# Patient Record
Sex: FEMALE | Race: WHITE | NOT HISPANIC OR LATINO | Employment: OTHER | URBAN - METROPOLITAN AREA
[De-identification: names, ages, dates, MRNs, and addresses within clinical notes are randomized per-mention and may not be internally consistent; named-entity substitution may affect disease eponyms.]

---

## 2017-05-25 ENCOUNTER — CONVERSION ENCOUNTER (OUTPATIENT)
Dept: GENERAL RADIOLOGY | Facility: HOSPITAL | Age: 72
End: 2017-05-25

## 2018-04-03 ENCOUNTER — OFFICE VISIT CONVERTED (OUTPATIENT)
Dept: ORTHOPEDIC SURGERY | Facility: CLINIC | Age: 73
End: 2018-04-03
Attending: ORTHOPAEDIC SURGERY

## 2018-04-18 ENCOUNTER — OFFICE VISIT CONVERTED (OUTPATIENT)
Dept: CARDIOLOGY | Facility: CLINIC | Age: 73
End: 2018-04-18
Attending: INTERNAL MEDICINE

## 2018-04-25 ENCOUNTER — OFFICE VISIT CONVERTED (OUTPATIENT)
Dept: ORTHOPEDIC SURGERY | Facility: CLINIC | Age: 73
End: 2018-04-25
Attending: ORTHOPAEDIC SURGERY

## 2018-06-06 ENCOUNTER — OFFICE VISIT CONVERTED (OUTPATIENT)
Dept: ORTHOPEDIC SURGERY | Facility: CLINIC | Age: 73
End: 2018-06-06
Attending: ORTHOPAEDIC SURGERY

## 2018-07-02 ENCOUNTER — OFFICE VISIT CONVERTED (OUTPATIENT)
Dept: OTOLARYNGOLOGY | Facility: CLINIC | Age: 73
End: 2018-07-02
Attending: OTOLARYNGOLOGY

## 2018-07-19 ENCOUNTER — OFFICE VISIT CONVERTED (OUTPATIENT)
Dept: OTOLARYNGOLOGY | Facility: CLINIC | Age: 73
End: 2018-07-19
Attending: OTOLARYNGOLOGY

## 2018-08-27 ENCOUNTER — OFFICE VISIT CONVERTED (OUTPATIENT)
Dept: OTOLARYNGOLOGY | Facility: CLINIC | Age: 73
End: 2018-08-27
Attending: OTOLARYNGOLOGY

## 2018-10-24 ENCOUNTER — OFFICE VISIT CONVERTED (OUTPATIENT)
Dept: CARDIOLOGY | Facility: CLINIC | Age: 73
End: 2018-10-24
Attending: INTERNAL MEDICINE

## 2018-10-25 ENCOUNTER — CONVERSION ENCOUNTER (OUTPATIENT)
Dept: GENERAL RADIOLOGY | Facility: HOSPITAL | Age: 73
End: 2018-10-25

## 2019-02-12 ENCOUNTER — HOSPITAL ENCOUNTER (OUTPATIENT)
Dept: PHYSICAL THERAPY | Facility: CLINIC | Age: 74
Setting detail: RECURRING SERIES
Discharge: HOME | End: 2019-03-13
Attending: FAMILY MEDICINE

## 2019-04-17 ENCOUNTER — HOSPITAL ENCOUNTER (OUTPATIENT)
Dept: OTHER | Facility: HOSPITAL | Age: 74
Discharge: HOME | End: 2019-04-17
Attending: INTERNAL MEDICINE

## 2019-04-17 LAB
ALBUMIN SERPL-MCNC: 4.2 G/DL (ref 3.5–5)
ALBUMIN/GLOB SERPL: 1.6 {RATIO} (ref 1.4–2.6)
ALP SERPL-CCNC: 75 U/L (ref 43–160)
ALT SERPL-CCNC: 24 U/L (ref 10–40)
ANION GAP SERPL CALC-SCNC: 16 MMOL/L (ref 8–19)
AST SERPL-CCNC: 20 U/L (ref 15–50)
BASOPHILS # BLD AUTO: 0.04 10*3/UL (ref 0–0.2)
BASOPHILS NFR BLD AUTO: 0.6 % (ref 0–3)
BILIRUB SERPL-MCNC: 0.58 MG/DL (ref 0.2–1.3)
BUN SERPL-MCNC: 17 MG/DL (ref 5–25)
BUN/CREAT SERPL: 21 {RATIO} (ref 6–20)
CALCIUM SERPL-MCNC: 9.9 MG/DL (ref 8.7–10.4)
CHLORIDE SERPL-SCNC: 105 MMOL/L (ref 99–111)
CHOLEST SERPL-MCNC: 152 MG/DL (ref 107–200)
CHOLEST/HDLC SERPL: 2.8 {RATIO} (ref 3–6)
CONV ABS IMM GRAN: 0.02 10*3/UL (ref 0–0.2)
CONV CO2: 25 MMOL/L (ref 22–32)
CONV IMMATURE GRAN: 0.3 % (ref 0–1.8)
CONV TOTAL PROTEIN: 6.8 G/DL (ref 6.3–8.2)
CREAT UR-MCNC: 0.81 MG/DL (ref 0.5–0.9)
DEPRECATED RDW RBC AUTO: 47.1 FL (ref 36.4–46.3)
EOSINOPHIL # BLD AUTO: 0.18 10*3/UL (ref 0–0.7)
EOSINOPHIL # BLD AUTO: 2.7 % (ref 0–7)
ERYTHROCYTE [DISTWIDTH] IN BLOOD BY AUTOMATED COUNT: 13.2 % (ref 11.7–14.4)
GFR SERPLBLD BASED ON 1.73 SQ M-ARVRAT: >60 ML/MIN/{1.73_M2}
GLOBULIN UR ELPH-MCNC: 2.6 G/DL (ref 2–3.5)
GLUCOSE SERPL-MCNC: 105 MG/DL (ref 65–99)
HBA1C MFR BLD: 13.3 G/DL (ref 12–16)
HCT VFR BLD AUTO: 40.6 % (ref 37–47)
HDLC SERPL-MCNC: 55 MG/DL (ref 40–60)
LDLC SERPL CALC-MCNC: 72 MG/DL (ref 70–100)
LYMPHOCYTES # BLD AUTO: 2.67 10*3/UL (ref 1–5)
MCH RBC QN AUTO: 31.5 PG (ref 27–31)
MCHC RBC AUTO-ENTMCNC: 32.8 G/DL (ref 33–37)
MCV RBC AUTO: 96.2 FL (ref 81–99)
MONOCYTES # BLD AUTO: 0.49 10*3/UL (ref 0.2–1.2)
MONOCYTES NFR BLD AUTO: 7.4 % (ref 3–10)
NEUTROPHILS # BLD AUTO: 3.24 10*3/UL (ref 2–8)
NEUTROPHILS NFR BLD AUTO: 48.8 % (ref 30–85)
NRBC CBCN: 0 % (ref 0–0.7)
OSMOLALITY SERPL CALC.SUM OF ELEC: 296 MOSM/KG (ref 273–304)
PLATELET # BLD AUTO: 271 10*3/UL (ref 130–400)
PMV BLD AUTO: 10 FL (ref 9.4–12.3)
POTASSIUM SERPL-SCNC: 4.4 MMOL/L (ref 3.5–5.3)
RBC # BLD AUTO: 4.22 10*6/UL (ref 4.2–5.4)
SODIUM SERPL-SCNC: 142 MMOL/L (ref 135–147)
TRIGL SERPL-MCNC: 127 MG/DL (ref 40–150)
VARIANT LYMPHS NFR BLD MANUAL: 40.2 % (ref 20–45)
VLDLC SERPL-MCNC: 25 MG/DL (ref 5–37)
WBC # BLD AUTO: 6.64 10*3/UL (ref 4.8–10.8)

## 2019-04-24 ENCOUNTER — CONVERSION ENCOUNTER (OUTPATIENT)
Dept: CARDIOLOGY | Facility: CLINIC | Age: 74
End: 2019-04-24

## 2019-04-24 ENCOUNTER — OFFICE VISIT CONVERTED (OUTPATIENT)
Dept: CARDIOLOGY | Facility: CLINIC | Age: 74
End: 2019-04-24
Attending: INTERNAL MEDICINE

## 2019-10-21 ENCOUNTER — HOSPITAL ENCOUNTER (OUTPATIENT)
Dept: OTHER | Facility: HOSPITAL | Age: 74
Discharge: HOME | End: 2019-10-21
Attending: INTERNAL MEDICINE

## 2019-10-21 LAB
ALBUMIN SERPL-MCNC: 4.4 G/DL (ref 3.5–5)
ALBUMIN/GLOB SERPL: 1.8 {RATIO} (ref 1.4–2.6)
ALP SERPL-CCNC: 74 U/L (ref 43–160)
ALT SERPL-CCNC: 28 U/L (ref 10–40)
ANION GAP SERPL CALC-SCNC: 15 MMOL/L (ref 8–19)
AST SERPL-CCNC: 24 U/L (ref 15–50)
BILIRUB SERPL-MCNC: 0.65 MG/DL (ref 0.2–1.3)
BUN SERPL-MCNC: 11 MG/DL (ref 5–25)
BUN/CREAT SERPL: 15 {RATIO} (ref 6–20)
CALCIUM SERPL-MCNC: 9.9 MG/DL (ref 8.7–10.4)
CHLORIDE SERPL-SCNC: 105 MMOL/L (ref 99–111)
CHOLEST SERPL-MCNC: 168 MG/DL (ref 107–200)
CHOLEST/HDLC SERPL: 3.7 {RATIO} (ref 3–6)
CONV CO2: 27 MMOL/L (ref 22–32)
CONV TOTAL PROTEIN: 6.9 G/DL (ref 6.3–8.2)
CREAT UR-MCNC: 0.74 MG/DL (ref 0.5–0.9)
GFR SERPLBLD BASED ON 1.73 SQ M-ARVRAT: >60 ML/MIN/{1.73_M2}
GLOBULIN UR ELPH-MCNC: 2.5 G/DL (ref 2–3.5)
GLUCOSE SERPL-MCNC: 103 MG/DL (ref 65–99)
HDLC SERPL-MCNC: 46 MG/DL (ref 40–60)
LDLC SERPL CALC-MCNC: 79 MG/DL (ref 70–100)
OSMOLALITY SERPL CALC.SUM OF ELEC: 296 MOSM/KG (ref 273–304)
POTASSIUM SERPL-SCNC: 4.2 MMOL/L (ref 3.5–5.3)
SODIUM SERPL-SCNC: 143 MMOL/L (ref 135–147)
T4 FREE SERPL-MCNC: 1.4 NG/DL (ref 0.9–1.8)
TRIGL SERPL-MCNC: 217 MG/DL (ref 40–150)
TSH SERPL-ACNC: 3.75 M[IU]/L (ref 0.27–4.2)
VLDLC SERPL-MCNC: 43 MG/DL (ref 5–37)

## 2019-10-28 ENCOUNTER — OFFICE VISIT CONVERTED (OUTPATIENT)
Dept: CARDIOLOGY | Facility: CLINIC | Age: 74
End: 2019-10-28
Attending: INTERNAL MEDICINE

## 2019-11-15 ENCOUNTER — HOSPITAL ENCOUNTER (OUTPATIENT)
Dept: GENERAL RADIOLOGY | Facility: HOSPITAL | Age: 74
Discharge: HOME | End: 2019-11-15
Attending: NURSE PRACTITIONER

## 2019-12-27 ENCOUNTER — HOSPITAL ENCOUNTER (OUTPATIENT)
Dept: OTHER | Facility: HOSPITAL | Age: 74
Discharge: HOME | End: 2019-12-27
Attending: INTERNAL MEDICINE

## 2019-12-27 LAB
CHOLEST SERPL-MCNC: 142 MG/DL (ref 107–200)
CHOLEST/HDLC SERPL: 2.8 {RATIO} (ref 3–6)
HDLC SERPL-MCNC: 50 MG/DL (ref 40–60)
LDLC SERPL CALC-MCNC: 66 MG/DL (ref 70–100)
TRIGL SERPL-MCNC: 128 MG/DL (ref 40–150)
VLDLC SERPL-MCNC: 26 MG/DL (ref 5–37)

## 2020-04-27 ENCOUNTER — HOSPITAL ENCOUNTER (OUTPATIENT)
Dept: LAB | Facility: HOSPITAL | Age: 75
Discharge: HOME | End: 2020-04-27
Attending: NURSE PRACTITIONER

## 2020-04-27 LAB
ALBUMIN SERPL-MCNC: 4.4 G/DL (ref 3.5–5)
ALBUMIN/GLOB SERPL: 1.8 {RATIO} (ref 1.4–2.6)
ALP SERPL-CCNC: 71 U/L (ref 43–160)
ALT SERPL-CCNC: 33 U/L (ref 10–40)
ANION GAP SERPL CALC-SCNC: 18 MMOL/L (ref 8–19)
AST SERPL-CCNC: 23 U/L (ref 15–50)
BILIRUB SERPL-MCNC: 0.48 MG/DL (ref 0.2–1.3)
BUN SERPL-MCNC: 13 MG/DL (ref 5–25)
BUN/CREAT SERPL: 19 {RATIO} (ref 6–20)
CALCIUM SERPL-MCNC: 10.6 MG/DL (ref 8.7–10.4)
CHLORIDE SERPL-SCNC: 105 MMOL/L (ref 99–111)
CHOLEST SERPL-MCNC: 162 MG/DL (ref 107–200)
CHOLEST/HDLC SERPL: 2.9 {RATIO} (ref 3–6)
CONV CO2: 24 MMOL/L (ref 22–32)
CONV TOTAL PROTEIN: 6.8 G/DL (ref 6.3–8.2)
CREAT UR-MCNC: 0.69 MG/DL (ref 0.5–0.9)
GFR SERPLBLD BASED ON 1.73 SQ M-ARVRAT: >60 ML/MIN/{1.73_M2}
GLOBULIN UR ELPH-MCNC: 2.4 G/DL (ref 2–3.5)
GLUCOSE SERPL-MCNC: 99 MG/DL (ref 65–99)
HDLC SERPL-MCNC: 56 MG/DL (ref 40–60)
LDLC SERPL CALC-MCNC: 77 MG/DL (ref 70–100)
OSMOLALITY SERPL CALC.SUM OF ELEC: 296 MOSM/KG (ref 273–304)
POTASSIUM SERPL-SCNC: 4.2 MMOL/L (ref 3.5–5.3)
SODIUM SERPL-SCNC: 143 MMOL/L (ref 135–147)
TRIGL SERPL-MCNC: 144 MG/DL (ref 40–150)
VLDLC SERPL-MCNC: 29 MG/DL (ref 5–37)

## 2020-05-04 ENCOUNTER — TELEPHONE CONVERTED (OUTPATIENT)
Dept: CARDIOLOGY | Facility: CLINIC | Age: 75
End: 2020-05-04
Attending: INTERNAL MEDICINE

## 2020-06-16 ENCOUNTER — HOSPITAL ENCOUNTER (OUTPATIENT)
Dept: LAB | Facility: HOSPITAL | Age: 75
Discharge: HOME | End: 2020-06-16
Attending: NURSE PRACTITIONER

## 2020-06-16 LAB
CHOLEST SERPL-MCNC: 150 MG/DL (ref 107–200)
CHOLEST/HDLC SERPL: 3.1 {RATIO} (ref 3–6)
HDLC SERPL-MCNC: 49 MG/DL (ref 40–60)
LDLC SERPL CALC-MCNC: 75 MG/DL (ref 70–100)
TRIGL SERPL-MCNC: 130 MG/DL (ref 40–150)
VLDLC SERPL-MCNC: 26 MG/DL (ref 5–37)

## 2020-09-01 ENCOUNTER — HOSPITAL ENCOUNTER (OUTPATIENT)
Dept: LAB | Facility: HOSPITAL | Age: 75
Discharge: HOME | End: 2020-09-01
Attending: INTERNAL MEDICINE

## 2020-09-01 LAB
ALBUMIN SERPL-MCNC: 4.2 G/DL (ref 3.5–5)
ALBUMIN/GLOB SERPL: 1.8 {RATIO} (ref 1.4–2.6)
ALP SERPL-CCNC: 80 U/L (ref 43–160)
ALT SERPL-CCNC: 43 U/L (ref 10–40)
ANION GAP SERPL CALC-SCNC: 15 MMOL/L (ref 8–19)
AST SERPL-CCNC: 33 U/L (ref 15–50)
BILIRUB SERPL-MCNC: 0.58 MG/DL (ref 0.2–1.3)
BUN SERPL-MCNC: 14 MG/DL (ref 5–25)
BUN/CREAT SERPL: 17 {RATIO} (ref 6–20)
CALCIUM SERPL-MCNC: 10.5 MG/DL (ref 8.7–10.4)
CHLORIDE SERPL-SCNC: 103 MMOL/L (ref 99–111)
CHOLEST SERPL-MCNC: 125 MG/DL (ref 107–200)
CHOLEST/HDLC SERPL: 2.7 {RATIO} (ref 3–6)
CONV CO2: 25 MMOL/L (ref 22–32)
CONV TOTAL PROTEIN: 6.5 G/DL (ref 6.3–8.2)
CREAT UR-MCNC: 0.81 MG/DL (ref 0.5–0.9)
GFR SERPLBLD BASED ON 1.73 SQ M-ARVRAT: >60 ML/MIN/{1.73_M2}
GLOBULIN UR ELPH-MCNC: 2.3 G/DL (ref 2–3.5)
GLUCOSE SERPL-MCNC: 104 MG/DL (ref 65–99)
HDLC SERPL-MCNC: 47 MG/DL (ref 40–60)
LDLC SERPL CALC-MCNC: 51 MG/DL (ref 70–100)
OSMOLALITY SERPL CALC.SUM OF ELEC: 289 MOSM/KG (ref 273–304)
POTASSIUM SERPL-SCNC: 4.4 MMOL/L (ref 3.5–5.3)
SODIUM SERPL-SCNC: 139 MMOL/L (ref 135–147)
T4 FREE SERPL-MCNC: 1.5 NG/DL (ref 0.9–1.8)
TRIGL SERPL-MCNC: 133 MG/DL (ref 40–150)
TSH SERPL-ACNC: 3.59 M[IU]/L (ref 0.27–4.2)
VLDLC SERPL-MCNC: 27 MG/DL (ref 5–37)

## 2020-09-10 ENCOUNTER — CONVERSION ENCOUNTER (OUTPATIENT)
Dept: CARDIOLOGY | Facility: CLINIC | Age: 75
End: 2020-09-10

## 2020-09-10 ENCOUNTER — OFFICE VISIT CONVERTED (OUTPATIENT)
Dept: CARDIOLOGY | Facility: CLINIC | Age: 75
End: 2020-09-10
Attending: INTERNAL MEDICINE

## 2020-10-23 ENCOUNTER — HOSPITAL ENCOUNTER (OUTPATIENT)
Dept: LAB | Facility: HOSPITAL | Age: 75
Discharge: HOME | End: 2020-10-23
Attending: INTERNAL MEDICINE

## 2020-10-23 ENCOUNTER — OFFICE VISIT CONVERTED (OUTPATIENT)
Dept: FAMILY MEDICINE CLINIC | Facility: CLINIC | Age: 75
End: 2020-10-23
Attending: INTERNAL MEDICINE

## 2020-10-23 ENCOUNTER — CONVERSION ENCOUNTER (OUTPATIENT)
Dept: FAMILY MEDICINE CLINIC | Facility: CLINIC | Age: 75
End: 2020-10-23

## 2020-10-23 LAB
BASOPHILS # BLD AUTO: 0.04 10*3/UL (ref 0–0.2)
BASOPHILS NFR BLD AUTO: 0.5 % (ref 0–3)
CONV ABS IMM GRAN: 0.03 10*3/UL (ref 0–0.2)
CONV IMMATURE GRAN: 0.4 % (ref 0–1.8)
DEPRECATED RDW RBC AUTO: 46.9 FL (ref 36.4–46.3)
EOSINOPHIL # BLD AUTO: 0.07 10*3/UL (ref 0–0.7)
EOSINOPHIL # BLD AUTO: 0.8 % (ref 0–7)
ERYTHROCYTE [DISTWIDTH] IN BLOOD BY AUTOMATED COUNT: 13.2 % (ref 11.7–14.4)
FOLATE SERPL-MCNC: >20 NG/ML (ref 4.8–20)
HCT VFR BLD AUTO: 44.5 % (ref 37–47)
HGB BLD-MCNC: 14.5 G/DL (ref 12–16)
IRON SATN MFR SERPL: 32 % (ref 20–55)
IRON SERPL-MCNC: 142 UG/DL (ref 60–170)
LYMPHOCYTES # BLD AUTO: 3.43 10*3/UL (ref 1–5)
LYMPHOCYTES NFR BLD AUTO: 41.5 % (ref 20–45)
MCH RBC QN AUTO: 31.3 PG (ref 27–31)
MCHC RBC AUTO-ENTMCNC: 32.6 G/DL (ref 33–37)
MCV RBC AUTO: 95.9 FL (ref 81–99)
MONOCYTES # BLD AUTO: 0.55 10*3/UL (ref 0.2–1.2)
MONOCYTES NFR BLD AUTO: 6.7 % (ref 3–10)
NEUTROPHILS # BLD AUTO: 4.15 10*3/UL (ref 2–8)
NEUTROPHILS NFR BLD AUTO: 50.1 % (ref 30–85)
NRBC CBCN: 0 % (ref 0–0.7)
PLATELET # BLD AUTO: 325 10*3/UL (ref 130–400)
PMV BLD AUTO: 10.5 FL (ref 9.4–12.3)
RBC # BLD AUTO: 4.64 10*6/UL (ref 4.2–5.4)
TIBC SERPL-MCNC: 450 UG/DL (ref 245–450)
TRANSFERRIN SERPL-MCNC: 315 MG/DL (ref 250–380)
VIT B12 SERPL-MCNC: 644 PG/ML (ref 211–911)
WBC # BLD AUTO: 8.27 10*3/UL (ref 4.8–10.8)

## 2020-12-10 ENCOUNTER — HOSPITAL ENCOUNTER (OUTPATIENT)
Dept: LAB | Facility: HOSPITAL | Age: 75
Discharge: HOME | End: 2020-12-10
Attending: INTERNAL MEDICINE

## 2020-12-10 LAB
ALBUMIN SERPL-MCNC: 4.2 G/DL (ref 3.5–5)
ALBUMIN/GLOB SERPL: 1.6 {RATIO} (ref 1.4–2.6)
ALP SERPL-CCNC: 77 U/L (ref 43–160)
ALT SERPL-CCNC: 31 U/L (ref 10–40)
ANION GAP SERPL CALC-SCNC: 13 MMOL/L (ref 8–19)
AST SERPL-CCNC: 28 U/L (ref 15–50)
BILIRUB SERPL-MCNC: 0.52 MG/DL (ref 0.2–1.3)
BUN SERPL-MCNC: 14 MG/DL (ref 5–25)
BUN/CREAT SERPL: 18 {RATIO} (ref 6–20)
CALCIUM SERPL-MCNC: 10 MG/DL (ref 8.7–10.4)
CHLORIDE SERPL-SCNC: 105 MMOL/L (ref 99–111)
CHOLEST SERPL-MCNC: 171 MG/DL (ref 107–200)
CHOLEST/HDLC SERPL: 3.7 {RATIO} (ref 3–6)
CONV CO2: 26 MMOL/L (ref 22–32)
CONV TOTAL PROTEIN: 6.8 G/DL (ref 6.3–8.2)
CREAT UR-MCNC: 0.76 MG/DL (ref 0.5–0.9)
GFR SERPLBLD BASED ON 1.73 SQ M-ARVRAT: >60 ML/MIN/{1.73_M2}
GLOBULIN UR ELPH-MCNC: 2.6 G/DL (ref 2–3.5)
GLUCOSE SERPL-MCNC: 100 MG/DL (ref 65–99)
HDLC SERPL-MCNC: 46 MG/DL (ref 40–60)
LDLC SERPL CALC-MCNC: 89 MG/DL (ref 70–100)
OSMOLALITY SERPL CALC.SUM OF ELEC: 291 MOSM/KG (ref 273–304)
POTASSIUM SERPL-SCNC: 4 MMOL/L (ref 3.5–5.3)
SODIUM SERPL-SCNC: 140 MMOL/L (ref 135–147)
TRIGL SERPL-MCNC: 178 MG/DL (ref 40–150)
VLDLC SERPL-MCNC: 36 MG/DL (ref 5–37)

## 2021-01-28 ENCOUNTER — OFFICE VISIT CONVERTED (OUTPATIENT)
Dept: FAMILY MEDICINE CLINIC | Facility: CLINIC | Age: 76
End: 2021-01-28
Attending: PHYSICIAN ASSISTANT

## 2021-02-12 ENCOUNTER — HOSPITAL ENCOUNTER (OUTPATIENT)
Dept: LAB | Facility: HOSPITAL | Age: 76
Discharge: HOME | End: 2021-02-12
Attending: INTERNAL MEDICINE

## 2021-02-12 LAB
ANION GAP SERPL CALC-SCNC: 13 MMOL/L (ref 8–19)
BUN SERPL-MCNC: 13 MG/DL (ref 5–25)
BUN/CREAT SERPL: 18 {RATIO} (ref 6–20)
CALCIUM SERPL-MCNC: 10.5 MG/DL (ref 8.7–10.4)
CHLORIDE SERPL-SCNC: 102 MMOL/L (ref 99–111)
CONV CO2: 29 MMOL/L (ref 22–32)
CREAT UR-MCNC: 0.73 MG/DL (ref 0.5–0.9)
GFR SERPLBLD BASED ON 1.73 SQ M-ARVRAT: >60 ML/MIN/{1.73_M2}
GLUCOSE SERPL-MCNC: 96 MG/DL (ref 65–99)
OSMOLALITY SERPL CALC.SUM OF ELEC: 290 MOSM/KG (ref 273–304)
POTASSIUM SERPL-SCNC: 3.8 MMOL/L (ref 3.5–5.3)
SODIUM SERPL-SCNC: 140 MMOL/L (ref 135–147)

## 2021-02-26 ENCOUNTER — HOSPITAL ENCOUNTER (OUTPATIENT)
Dept: GENERAL RADIOLOGY | Facility: HOSPITAL | Age: 76
Discharge: HOME | End: 2021-02-26
Attending: INTERNAL MEDICINE

## 2021-03-15 ENCOUNTER — HOSPITAL ENCOUNTER (OUTPATIENT)
Dept: LAB | Facility: HOSPITAL | Age: 76
Discharge: HOME | End: 2021-03-15
Attending: NURSE PRACTITIONER

## 2021-03-15 LAB
ALBUMIN SERPL-MCNC: 4.4 G/DL (ref 3.5–5)
ALBUMIN/GLOB SERPL: 1.6 {RATIO} (ref 1.4–2.6)
ALP SERPL-CCNC: 78 U/L (ref 43–160)
ALT SERPL-CCNC: 31 U/L (ref 10–40)
ANION GAP SERPL CALC-SCNC: 19 MMOL/L (ref 8–19)
AST SERPL-CCNC: 29 U/L (ref 15–50)
BILIRUB SERPL-MCNC: 0.22 MG/DL (ref 0.2–1.3)
BUN SERPL-MCNC: 18 MG/DL (ref 5–25)
BUN/CREAT SERPL: 23 {RATIO} (ref 6–20)
CALCIUM SERPL-MCNC: 10.9 MG/DL (ref 8.7–10.4)
CHLORIDE SERPL-SCNC: 102 MMOL/L (ref 99–111)
CHOLEST SERPL-MCNC: 194 MG/DL (ref 107–200)
CHOLEST/HDLC SERPL: 3.9 {RATIO} (ref 3–6)
CONV CO2: 23 MMOL/L (ref 22–32)
CONV TOTAL PROTEIN: 7.1 G/DL (ref 6.3–8.2)
CREAT UR-MCNC: 0.78 MG/DL (ref 0.5–0.9)
GFR SERPLBLD BASED ON 1.73 SQ M-ARVRAT: >60 ML/MIN/{1.73_M2}
GLOBULIN UR ELPH-MCNC: 2.7 G/DL (ref 2–3.5)
GLUCOSE SERPL-MCNC: 96 MG/DL (ref 65–99)
HDLC SERPL-MCNC: 50 MG/DL (ref 40–60)
LDLC SERPL CALC-MCNC: 109 MG/DL (ref 70–100)
OSMOLALITY SERPL CALC.SUM OF ELEC: 292 MOSM/KG (ref 273–304)
POTASSIUM SERPL-SCNC: 3.8 MMOL/L (ref 3.5–5.3)
SODIUM SERPL-SCNC: 140 MMOL/L (ref 135–147)
TRIGL SERPL-MCNC: 176 MG/DL (ref 40–150)
VLDLC SERPL-MCNC: 35 MG/DL (ref 5–37)

## 2021-04-05 ENCOUNTER — OFFICE VISIT CONVERTED (OUTPATIENT)
Dept: CARDIOLOGY | Facility: CLINIC | Age: 76
End: 2021-04-05
Attending: INTERNAL MEDICINE

## 2021-04-29 ENCOUNTER — OFFICE VISIT CONVERTED (OUTPATIENT)
Dept: FAMILY MEDICINE CLINIC | Facility: CLINIC | Age: 76
End: 2021-04-29
Attending: PHYSICIAN ASSISTANT
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
## 2021-05-13 NOTE — PROGRESS NOTES
Progress Note      Patient Name: Shreya Marino   Patient ID: [id number]   Sex: Female   YOB: 1945    Primary Care Provider: Frandy Gibbs MD   Referring Provider: Frandy Gibbs MD    Visit Date: September 10, 2020    Provider: Frandy Renner MD   Location: [facility]   Location Address: [address] [place] [address], [address]   [place]  [id number]   Location Phone: [phone]          Chief Complaint  · Coronary artery disease, hypertension, hyperlipidemia, frequent bowel movements with Zetia      History Of Present Illness  REFERRING PROVIDER: Frandy Gibbs MD   Shreya Marino is a 75-year-old female with coronary artery disease and hyperlipidemia who is doing well from a cardiac standpoint. She denies chest pain, shortness of breath, PND, orthopnea, palpitations, dizziness, or syncope. Patient complains of having frequent bowel movements with the Zetia. She also has been complaining of myalgias with her Atorvastatin, mostly at night, and would like to change it.   PAST MEDICAL HISTORY: Coronary artery disease with previous stent/PCI (June 2016); hyperlipidemia .   FAMILY HISTORY: Positive for hypertension and heart disease. Negative for diabetes   PSYCHOSOCIAL HISTORY: No history of mood change or depression. She does not drink alcohol. She does not use tobacco.   CURRENT MEDICATIONS: include Ezetimibe 10 mg daily; Atorvastatin 40 mg at bedtime three days a week and 1/2 tablet the rest of the week; aspirin 81 mg daily; Zyrtec 10 mg daily; Caltrate 600 mg b.i.d.; vitamin D; Biotin; etc. The dosage and frequency of the medications were reviewed with the patient.       Review of Systems  · Cardiovascular  o Denies  o : palpitations (fast, fluttering, or skipping beats), swelling (feet, ankles, hands), shortness of breath while walking or lying flat, chest pain or angina pectoris   · Respiratory  o Denies  o : chronic or frequent cough, asthma or wheezing      Vitals  Date Time BP Position Site  "L\R Cuff Size HR RR TEMP (F) WT  HT  BMI kg/m2 BSA m2 O2 Sat        09/10/2020 10:12 /72 Sitting    84 - R   179lbs 0oz 5'  5\" 29.79 1.93            Blood pressure log looks good.       Physical Examination  · Constitutional  o Appearance  o : Awake, alert, in no acute distress.  · Eyes  o Conjunctivae  o : Conjunctivae normal.  · Ears, Nose, Mouth and Throat  o Oral Cavity  o :   § Oral Mucosa  § : Normal.  · Neck  o Inspection/Palpation/Auscultation  o : No lymphadenopathy. No JVD. No bruit. Good carotid upstroke.  · Respiratory  o Respiratory  o : Clear to percussion and auscultation. Good respiratory effort.  · Cardiovascular  o Heart  o : PMI is not well felt. S1, S2 are normal.   o Peripheral Vascular System  o :   § Extremities  § : Good femoral and pedal pulses. No pedal edema.  · Gastrointestinal  o Abdominal Examination  o : Soft. No masses or tenderness felt. No hepatosplenomegaly. Abdominal aorta is not palpable.  · EKG  o Indications  o : Coronary artery disease.  o Results  o : Sinus rhythm, borderline left axis deviation. Low voltage precordial leads.   o Comparison  o : No change compared to previous EKG.   · Labs  o Labs  o : HDL 47, LDL 51, triglyceride 133, ALT is minimally elevated at 43.           Assessment     1.  Coronary artery disease without angina pectoris.   2.  Hyperlipidemia, at goal.   3.  Increased bowel movements with Zetia and myalgias with Atorvastatin.       Plan     1.  Discontinue the Atorvastatin and start Livalo 1 mg a day.   2.  Break up the Zetia to 1/2 tablet b.i.d. and if that does not help her bowel movements, then we will go        to 1/2 tablet once a day.   3.  Obtain lipids and CMP in three months and try to achieve an LDL of less than 70.   4.  Follow up in six months.      Zulema Byrd MD, [facility]  PM/pap    This note was transcribed by Dottie Sampson.  pap/pm  The above service was transcribed by Dottie Sampson, and I attest to the accuracy of the note.  PM "             Electronically Signed by: Shanthi Sampson-, Other -Author on September 15, 2020 08:10:49 AM  Electronically Co-signed by: Zulema Byrd MD -Reviewer on September 15, 2020 04:47:33 PM

## 2021-05-13 NOTE — PROGRESS NOTES
Quick Note      Patient Name: Shreya Marino   Patient ID: [id number]   Sex: Female   YOB: 1945    Primary Care Provider: Frandy Gibbs MD   Referring Provider: Frandy Gibbs MD    Visit Date: May 4, 2020    Provider: Zulema Byrd MD   Location: [facility]   Location Address: [address] [place] [address], [facility]   [place]  [id number]   Location Phone: [phone]          History Of Present Illness  TELEHEALTH TELEPHONE VISIT  Chief Complaint: Diarrhea with Toprol, Hyperlipidemia, Coronary artery disease   Shreya Marino is a 74 year old /White female with coronary artery disease and had who is doing very well. Since her Toprol was stopped, her diarrhea has resolved. She denies any chest pain, palpitations, dizziness, or syncope. She has not had any significant chest pain over the last 6 months. She is presenting for evaluation via telehealth telephone visit. Verbal consent obtained before beginning visit. Telehealth visit due to COVID-19.   Provider spent 6 minutes with the patient during telehealth visit.   The following staff were present during this visit: Provider only.   Past Medical History/Overview of Patient Symptoms     PAST MEDICAL HISTORY:  Coronary artery disease with previous stent/PCI (June 2016); Hyperlipidemia.      FAMILY HISTORY:   Not indicated.      PSYCHOSOCIAL HISTORY:  Denies alcohol or tobacco use.  Gardening and works in yard for exercise.      CURRENT MEDICATIONS:  Aspirin 81 mg daily; atorvastatin 40 mg one on Monday, Wednesday, Friday and half the rest of the week; nitroglycerin p.r.n.; multivitamin daily; vitamin D3 daily; biotin daily; Caltrate with vitamin D b.i.d.; Zyrtec one daily p.r.n.  Dosage and frequency of the medications were reviewed with the patient.      REVIEW OF SYSTEMS:   Cardiovascular:  Denies palpitations (fast, fluttering, or skipping beats), swelling (feet, ankles, hands), shortness of breath while walking or lying  flat, chest pain or angina pectoris   Respiratory: Admits chronic or frequent cough; Denies asthma or wheezing       Vitals     Per patient, at-home vitals:   Blood pressure 118/66.  Heart rate 84.  Weight 175.       Physical Examination  · Labs  o Labs  o : Chemistry panel is normal. HDL 56, LDL 77, triglycerides 144.          Assessment     1.  Diarrhea with Toprol, resolved after discontinuing Toprol.  2.  Blood pressure well controlled off Toprol.  3.  Hyperlipidemia, not at goal.   4.  Coronary artery disease with previous stent/PCI without angina pectoris.       Plan     1.  Continue to stay off the metoprolol.  2.  We will consider starting Coreg if she starts to have anginal symptomatology.   3.  Increase the atorvastatin to 40 mg one on four days a week and half three days a week.  4.  Obtain a lipid panel in 6 weeks and office visit with blood work in four months.      Zulema Byrd MD, [facility]  PM/vm             Electronically Signed by: Cass Manjarrez-, Other -Author on May 7, 2020 01:11:43 PM  Electronically Co-signed by: Zulema Byrd MD -Reviewer on May 12, 2020 12:29:42 PM

## 2021-05-14 VITALS
WEIGHT: 180.37 LBS | TEMPERATURE: 97.9 F | DIASTOLIC BLOOD PRESSURE: 82 MMHG | SYSTOLIC BLOOD PRESSURE: 119 MMHG | HEIGHT: 65 IN | BODY MASS INDEX: 30.05 KG/M2 | HEART RATE: 95 BPM | OXYGEN SATURATION: 96 %

## 2021-05-14 VITALS
HEIGHT: 65 IN | BODY MASS INDEX: 29.82 KG/M2 | SYSTOLIC BLOOD PRESSURE: 130 MMHG | HEART RATE: 84 BPM | DIASTOLIC BLOOD PRESSURE: 72 MMHG | WEIGHT: 179 LBS

## 2021-05-14 VITALS
HEART RATE: 81 BPM | OXYGEN SATURATION: 96 % | BODY MASS INDEX: 29.42 KG/M2 | WEIGHT: 176.56 LBS | HEIGHT: 65 IN | DIASTOLIC BLOOD PRESSURE: 86 MMHG | SYSTOLIC BLOOD PRESSURE: 135 MMHG

## 2021-05-14 VITALS
WEIGHT: 180 LBS | SYSTOLIC BLOOD PRESSURE: 114 MMHG | HEART RATE: 76 BPM | BODY MASS INDEX: 29.99 KG/M2 | HEIGHT: 65 IN | DIASTOLIC BLOOD PRESSURE: 74 MMHG

## 2021-05-14 VITALS
BODY MASS INDEX: 29.91 KG/M2 | OXYGEN SATURATION: 95 % | WEIGHT: 179.5 LBS | HEIGHT: 65 IN | SYSTOLIC BLOOD PRESSURE: 122 MMHG | DIASTOLIC BLOOD PRESSURE: 81 MMHG | HEART RATE: 84 BPM

## 2021-05-14 NOTE — PROGRESS NOTES
Progress Note      Patient Name: Shreya Marino   Patient ID: [id number]   Sex: Female   YOB: 1945    Primary Care Provider: Alvino Mosley PA-C   Referring Provider: Alvino Mosley PA-C    Visit Date: January 28, 2021    Provider: Alvino Mosley PA-C   Location: [facility]   Location Address: [address] [place] [address], [address]  [place], [place]  [id number]   Location Phone: [phone]          Chief Complaint  · Annual Wellness Exam      History Of Present Illness  The patient is a 75 year old /White female who has come to this office for her Annual Wellness Visit.   Her Primary Care Provider is Alvino Mosley PA-C. Her comprehensive Care Team list, including suppliers, has been updated on the Facesheet. Her medical/family history, height, weight, BMI, and blood pressure have been reviewed and are in the chart. The Health Risk Assessment has been completed and scanned in the chart.   Medications are listed in the medication list.   The active problem list includes: Biceps tendinitis of right upper extremity, CAD (coronary artery disease), Hyperlipidemia, Nasal fracture, Right Rotator cuff tear, RIght shoulder pain, SLAP tear of shoulder, Subacromial impingement, Subacromial impingement of right shoulder, and Vitamin D deficiency   The patient does not have a history of substance use.   Patient reports her diet is adequate.   The Mini-Cog has been administered and is scanned in chart. The results are negative. Her cognitive function is without limitation.   A hearing loss screen was completed today and the result is negative.   Patient does not have any risk factors for depression. Patient completed the PHQ-9 today and it has been scanned in the chart. The total score is 1-4.   The Timed Up and Go screen was administered today and the result is negative.   The Tolbert Index of Bradley in ADLs indicated full function (score of 6).   A Falls Risk Assessment has been completed, including  a review of home fall hazards and medication review.   Overall, the patient's functional ability is noted by this provider to be within normal limits. Her level of safety is noted to be within normal limits. Her balance/gait is within normal limits. There have been no falls in the past year. Patient-specific home safety recommendations have been reviewed and a copy has been given to patient.   She admits issues with leaking urine. This happens frequently and she would like to discuss this further today.   There are no additional risk factors identified.   Living Will/Advanced Directive has not previously been completed.   Personalized health advice was given to the patient and a written health screening schedule was established; see Plan for details.   Shreya Marino is a 75 year old /White female who presents for evaluation and treatment of:       Past Medical History  Disease Name Date Onset Notes   Biceps tendinitis of right upper extremity 04/25/2018 --    CAD (coronary artery disease) 07/06/2016 --    Chest Pain --  --    Heart Attack --  --    Heartburn --  --    Hyperlipidemia 01/28/2021 --    Nasal fracture --  --    Right Rotator cuff tear 04/25/2018 --    RIght shoulder pain --  --    SLAP tear of shoulder 04/25/2018 --    Subacromial impingement 04/25/2018 --    Subacromial impingement of right shoulder 04/18/2018 --    Vitamin D deficiency 01/28/2021 --          Past Surgical History  Procedure Name Date Notes   Breast biopsy, left breast --  --    Cataract surgery --  --    Closed reduction, fracture, nasal septum 07/10/18 --    Hysterectomy --  --    Stent placment --  --          Medication List  Name Date Started Instructions   aspirin 81 mg oral tablet,delayed release (DR/EC)  take 1 tablet (81 mg) by oral route once daily   biotin 5,000 mcg oral tablet,disintegrating  dissolve 1 tablet by oral route daily   Caltrate 600 + D 600 mg (1,500 mg)-800 unit oral tablet,chewable  chew 1 tablet by  "oral route 2 times a day   ezetimibe 10 mg oral tablet 01/04/2021 TAKE ONE TABLET BY MOUTH DAILY   Livalo 1 mg oral tablet 12/10/2020 Take 1 tablet by mouth once daily   Nitrostat 0.4 mg sublingual tablet, sublingual 09/08/2017 DISSOLVE 1 TABLET UNDER TONGUE FOR CHEST PAIN - IF PAIN REMAINS AFTER 5 MIN, CALL 911 AND REPEAT DOSE. MAX 3 TABLETS IN 15 MINUTES.   One-A-Day Womens Formula 18 mg iron-400 mcg-500 mg Ca oral tablet  take 1 tablet by oral route daily   oxybutynin chloride 5 mg oral tablet extended release 24hr 01/28/2021 take 1 tablet (5 mg) by oral route once daily   Vitamin D3 1,000 unit oral capsule  take 1 capsule by oral route daily         Allergy List  Allergen Name Date Reaction Notes   NO KNOWN DRUG ALLERGIES --  --  --          Family Medical History  Disease Name Relative/Age Notes   Breast Neoplasm, Malignant  --    Heart Disease  --    Cancer, Unspecified  --          Social History  Finding Status Start/Stop Quantity Notes   lives with spouse --  --/-- --  --    Retired --  --/-- --  --    Tobacco Never --/-- --  --          Immunizations  NameDate Admin Mfg Trade Name Lot Number Route Inj VIS Given VIS Publication   [id number]/01/2020 SKB Fluzone Quadrivalent  NE NE 10/01/2020    Comments: drew         Vitals  Date Time BP Position Site L\R Cuff Size HR RR TEMP (F) WT  HT  BMI kg/m2 BSA m2 O2 Sat FR L/min FiO2 HC       01/28/2021 11:01 /81 Sitting    84 - R   179lbs 8oz 5'  5\" 29.87 1.93 95 %            Physical Examination  · Head and Face  o Head  o : normocephalic, atraumatic  · Ears, Nose, Mouth and Throat  o Ears  o :   § External Ears  § : external auditory canal appearance normal, no discharge present  § Otoscopic Examination  § : tympanic membranes pearly white/gray bilaterally  o Nose  o :   § External Nose  § : no lesions noted  § Nasopharynx  § : no discharge present  o Oral Cavity  o :   § Oral Mucosa  § : oral mucosa light pink  o Throat  o :   § Oropharynx  § : tonsils " without exudate, no palatal petechiae  · Neck  o Inspection/Palpation  o : normal appearance, no masses or tenderness, trachea midline  o Thyroid  o : gland size normal, nontender, no nodules or masses present on palpation  · Respiratory  o Respiratory Effort  o : breathing unlabored  o Inspection of Chest  o : chest rise symmetric bilaterally  o Auscultation of Lungs  o : clear to auscultation bilaterally throughout inspiration and expiration  · Cardiovascular  o Heart  o :   § Auscultation of Heart  § : regular rate and rhythm, no murmurs, gallops or rubs  o Peripheral Vascular System  o :   § Extremities  § : no edema  · Lymphatic  o Neck  o : no cervical lymphadenopathy, no supraclavicular lymphadenopathy  · Psychiatric  o Mood and Affect  o : mood normal, affect appropriate          Assessment  · Encounter for Medicare annual wellness exam     V70.0/Z00.00  · Screening for depression     V79.0/Z13.89  · Screening for alcoholism     V79.1/Z13.39  · Advanced care planning/counseling discussion     V65.49/Z71.89      Plan  · Orders  o Falls Risk Assessment Completed (3288F) - V70.0/Z00.00 - 01/28/2021  o Brief hearing screening (written) [facility] () - V70.0/Z00.00 - 01/28/2021  o Annual Wellness Visit-includes a Personalized Prevention Plan of Service (PPS), SUBSEQUENT VISIT (Medicare) () - V70.0/Z00.00 - 01/28/2021  o Presence or absence of urinary incontinence assessed (GLORY) (1090F) - V70.0/Z00.00 - 01/28/2021  o Negative alcohol screening () - V79.1/Z13.39 - 01/28/2021  o ACO-20: Screening Mammography documented and reviewed () - - 01/28/2021  o ACO-14: Influenza immunization administered or previously received [facility] () - - 01/28/2021  o ACO-39: Current medications updated and reviewed (, 1159F) - - 01/28/2021  o ACO-18: Negative screen for clinical depression using a standardized tool () - - 01/28/2021  o ACO-13: Fall Risk Screening with no falls in past year or only one fall  without injury in the past year (1101F) - - 01/28/2021  o ACO - Pt declines to or was not able to provide an Advance Care Plan or name a Surrogate Decision Maker (1124F) - - 01/28/2021  · Medications  o Medications have been Reconciled  o Transition of Care or Provider Policy  · Instructions  o Health Risk Assessment has been reviewed with the patient.  o Written health screening schedule for next 5-10 years was established with patient; information scanned in chart and given/mailed to patient.  o Fall prevention methods discussed and a copy of recommendations given/mailed to patient.  o Today's PHQ-9 score is: 2___  o Depression Screen completed and scanned into the EMR under the designated folder within the patient's documents.  o Patient was educated/instructed on their diagnosis, treatment and medications prior to discharge from the clinic today.            Electronically Signed by: Alvino Mosley PA-C -Author on January 28, 2021 12:26:09 PM

## 2021-05-14 NOTE — PROGRESS NOTES
Progress Note      Patient Name: Shreya Marino   Patient ID: [id number]   Sex: Female   YOB: 1945    Primary Care Provider: Alvino Mosley PA-C   Referring Provider: Alvino Mosley PA-C    Visit Date: January 28, 2021    Provider: Alvino Mosley PA-C   Location: [facility]   Location Address: [address] [place] [address], [address]  [place], [place]  [id number]   Location Phone: [phone]          Chief Complaint  · establish care  · bilateral ankle swelling  · urine incontinence      History Of Present Illness  Shreya Marino is a 75 year old /White female who presents for evaluation and treatment of: establish care.      pt presents today to laith benito/former  pt.    pt states since Christmas she has been experiencing swelling in both of her ankles. pt states by the end of the day they are swollen.  Pt states that the swelling occurs throughout the day.  Better in the mornings.  No orthopnea, cough, soa, CP    pt states she has been having urine incontinence off and on for a while. pt states at times she does not make it to the bathroom and she has been getting up several times during the night to go as well.    CAD-Cardio- last appt was 11/20 next appt is in the Spring    Labs 12/20  flu 10/20  AWV due today    Byrd - Cardio - Zetia 10mg QD hyperlipidemia - controlled    Two stents - CAD    Urinary urgency - began last summer   Bilat LE - edema - since Christmas         Past Medical History  Disease Name Date Onset Notes   Biceps tendinitis of right upper extremity 04/25/2018 --    CAD (coronary artery disease) 07/06/2016 --    Chest Pain --  --    Heart Attack --  --    Heartburn --  --    Nasal fracture --  --    Right Rotator cuff tear 04/25/2018 --    RIght shoulder pain --  --    SLAP tear of shoulder 04/25/2018 --    Subacromial impingement 04/25/2018 --    Subacromial impingement of right shoulder 04/18/2018 --          Past Surgical History  Procedure  Name Date Notes   Breast biopsy, left breast --  --    Cataract surgery --  --    Closed reduction, fracture, nasal septum 07/10/18 --    Hysterectomy --  --    Stent placment --  --          Medication List  Name Date Started Instructions   aspirin 81 mg oral tablet,delayed release (DR/EC)  take 1 tablet (81 mg) by oral route once daily   biotin 5,000 mcg oral tablet,disintegrating  dissolve 1 tablet by oral route daily   Caltrate 600 + D 600 mg (1,500 mg)-800 unit oral tablet,chewable  chew 1 tablet by oral route 2 times a day   ezetimibe 10 mg oral tablet 01/04/2021 TAKE ONE TABLET BY MOUTH DAILY   Livalo 1 mg oral tablet 12/10/2020 Take 1 tablet by mouth once daily   Nitrostat 0.4 mg sublingual tablet, sublingual 09/08/2017 DISSOLVE 1 TABLET UNDER TONGUE FOR CHEST PAIN - IF PAIN REMAINS AFTER 5 MIN, CALL 911 AND REPEAT DOSE. MAX 3 TABLETS IN 15 MINUTES.   One-A-Day Womens Formula 18 mg iron-400 mcg-500 mg Ca oral tablet  take 1 tablet by oral route daily   Vitamin D3 1,000 unit oral capsule  take 1 capsule by oral route daily         Allergy List  Allergen Name Date Reaction Notes   NO KNOWN DRUG ALLERGIES --  --  --        Allergies Reconciled  Family Medical History  Disease Name Relative/Age Notes   Breast Neoplasm, Malignant  --    Heart Disease  --    Cancer, Unspecified  --          Social History  Finding Status Start/Stop Quantity Notes   lives with spouse --  --/-- --  --    Retired --  --/-- --  --    Tobacco Never --/-- --  --          Immunizations  NameDate Admin Mfg Trade Name Lot Number Route Inj VIS Given VIS Publication   [id number]/01/2020 SKB Fluzone Quadrivalent  NE NE 10/01/2020    Comments: drew         Review of Systems  · Constitutional  o Denies  o : fever, fatigue, weight loss, weight gain  · Cardiovascular  o Admits  o : lower extremity edema  o Denies  o : claudication, chest pressure, palpitations  · Respiratory  o Denies  o : shortness of breath, wheezing, cough, hemoptysis,  "dyspnea on exertion  · Gastrointestinal  o Denies  o : nausea, vomiting, diarrhea, constipation, abdominal pain      Vitals  Date Time BP Position Site L\R Cuff Size HR RR TEMP (F) WT  HT  BMI kg/m2 BSA m2 O2 Sat FR L/min FiO2 HC       01/28/2021 11:01 /81 Sitting    84 - R   179lbs 8oz 5'  5\" 29.87 1.93 95 %            Physical Examination  · Constitutional  o Appearance  o : overweight, well developed  · Head and Face  o Head  o : normocephalic, atraumatic  · Ears, Nose, Mouth and Throat  o Ears  o :   § External Ears  § : external auditory canal appearance normal, no discharge present  § Otoscopic Examination  § : tympanic membranes pearly white/gray bilaterally  o Nose  o :   § External Nose  § : no lesions noted  § Nasopharynx  § : no discharge present  o Oral Cavity  o :   § Oral Mucosa  § : oral mucosa light pink  o Throat  o :   § Oropharynx  § : tonsils without exudate, no palatal petechiae  · Neck  o Inspection/Palpation  o : normal appearance, no masses or tenderness, trachea midline  o Thyroid  o : gland size normal, nontender, no nodules or masses present on palpation  · Respiratory  o Respiratory Effort  o : breathing unlabored  o Inspection of Chest  o : chest rise symmetric bilaterally  o Auscultation of Lungs  o : clear to auscultation bilaterally throughout inspiration and expiration  · Cardiovascular  o Heart  o :   § Auscultation of Heart  § : regular rate and rhythm, no murmurs, gallops or rubs  o Peripheral Vascular System  o :   § Extremities  § : mild lower extremity edema present, no cyanosis, no distal hair loss, normal capillary refill  · Lymphatic  o Neck  o : no cervical lymphadenopathy, no supraclavicular lymphadenopathy  · Psychiatric  o Mood and Affect  o : mood normal, affect appropriate          Assessment  · Hyperlipidemia     272.4/E78.5  · Vitamin D deficiency     268.9/E55.9  · Screening for depression     V79.0/Z13.89  · Need for influenza " vaccination     V04.81/Z23  · Urine incontinence     788.30/R32  · CAD (coronary artery disease)     414.00/I25.10  · Urinary urgency     788.63/R39.15  · Edema     782.3/R60.9      Plan  · Orders  o ACO-18: Negative screen for clinical depression using a standardized tool () - V79.0/Z13.89 - 01/28/2021  o ACO-14: Influenza immunization was not administered for reasons documented () - V04.81/Z23 - 01/28/2021   rcvd 10/20  o ACO-13: Fall Risk Screening with no falls in past year or only one fall without injury in the past year (1101F) - - 01/28/2021  o ACO-39: Current medications updated and reviewed (1159F, ) - - 01/28/2021  · Medications  o oxybutynin chloride 5 mg oral tablet extended release 24hr   SIG: take 1 tablet (5 mg) by oral route once daily   DISP: (30) Tablet with 0 refills  Prescribed on 01/28/2021     o Medications have been Reconciled  o Transition of Care or Provider Policy  · Instructions  o Patient was educated and given low cholesterol diet information.  o Recommended exercise program to assist with cholesterol, weight loss and overall health improvement.  o Advised that cheeses and other sources of dairy fats, animal fats, fast food, and the extras (candy, pastries, pies, doughnuts and cookies) all contain LDL raising nutrients. Advised to increase fruits, vegetables, whole grains, and to monitor portion sizes.   o Depression Screen completed and scanned into the EMR under the designated folder within the patient's documents.  o Today's PHQ-9 result is 2___  o Flu vaccine declined.  o Take all medications as prescribed/directed.  o Patient instructed/educated on their diet and exercise program.  o Patient was educated/instructed on their diagnosis, treatment and medications prior to discharge from the clinic today.  o Patient counseled to reduce calorie intake.  o Patient was instructed to exercise regularly.  o Support Hose #1 pair  · Disposition  o Call or Return if symptoms  worsen or persist.  o F/U in 3 months.  o Care Transition            Electronically Signed by: Avlino Mosley PA-C -Author on January 31, 2021 11:19:14 AM

## 2021-05-14 NOTE — PROGRESS NOTES
"   Progress Note      Patient Name: Shreya Marino   Patient ID: [id number]   Sex: Female   YOB: 1945    Primary Care Provider: Alvino Mosley PA-C   Referring Provider: Alvino Mosley PA-C    Visit Date: April 5, 2021    Provider: Zulema Byrd MD   Location: [facility]   Location Address: [address] [place] [address], [address]   [place]  [id number]   Location Phone: [phone]          Chief Complaint     Hyperlipidemia.  Coronary artery disease.       History Of Present Illness  REFERRING PROVIDER: Alvino Mosley PA-C   Shreya Marino is a 75 year old /White female with coronary artery disease, hyperlipidemia, and hypertension who continues to have problems with poor appetite, nausea, and diarrhea. She thinks it is one of her medications. She denies chest pain, dizziness, or syncope.   PAST MEDICAL HISTORY: Coronary artery disease with previous stent/PCI (June 2016); Hyperlipidemia; Hypertension.   PSYCHOSOCIAL HISTORY: Denies alcohol or tobacco use.   CURRENT MEDICATIONS: Livalo 1 mg daily; ezetimibe 10 mg daily; hydrochlorothiazide 12.5 mg daily; aspirin 81 mg daily.      ALLERGIES: No known drug allergies.       Review of Systems  · Cardiovascular  o Admits  o : swelling (feet, ankles, hands)  o Denies  o : palpitations (fast, fluttering, or skipping beats), shortness of breath while walking or lying flat, chest pain or angina pectoris   · Respiratory  o Denies  o : chronic or frequent cough      Vitals  Date Time BP Position Site L\R Cuff Size HR RR TEMP (F) WT  HT  BMI kg/m2 BSA m2 O2 Sat FR L/min FiO2 HC       04/05/2021 11:24 /74 Sitting    76 - R   180lbs 0oz 5'  5\" 29.95 1.94             Physical Examination  · Constitutional  o Appearance  o : Awake, alert, in no acute distress.  · Eyes  o Conjunctivae  o : Conjunctivae normal.  · Ears, Nose, Mouth and Throat  o Oral Cavity  o :   § Oral Mucosa  § : Normal.  · Neck  o Inspection/Palpation/Auscultation  o : No lymphadenopathy. " No JVD. No bruit. Good carotid upstroke.  · Respiratory  o Respiratory  o : Clear to percussion and auscultation. Good respiratory effort.  · Cardiovascular  o Heart  o : PMI not well felt. S1, S2 normal.   o Peripheral Vascular System  o :   § Extremities  § : Good femoral and pedal pulses. No pedal edema.  · Gastrointestinal  o Abdominal Examination  o : Soft. No masses or tenderness felt. No hepatosplenomegaly. Abdominal aorta is not palpable.  · Labs  o Labs  o : Chemistry panel is normal. HDL 50, , triglycerides 176.           Assessment     1.  Coronary artery disease without angina pectoris.  2.  Hypertension, controlled.  3.  Hyperlipidemia, not at goal.  Her LDL needs to be much below 70.       Plan     1.  Discussed with her various options.  She is extremely reluctant to try Repatha, but I have convinced her that        she should.  I am going to cut down her ezetimibe to half-a-tablet a day since that is probably the most        likely cause of her diarrhea.  We had told her to do so last time, but she forgot.  2.  Start on Repatha 140 mg/mL twice a month.  We will obtain lipids and CMP in 3 months, and if LDL is at        goal, we will stop her ezetimibe.  In the future, we could potentially cut out her Livalo, too.  3.  She will follow up in 6 months with an office visit.        Zulema Byrd MD, [facility]  PM:vm             Electronically Signed by: Cass Manjarrez-, Other -Author on April 14, 2021 12:35:40 PM  Electronically Co-signed by: Zulema Byrd MD -Reviewer on April 20, 2021 12:07:47 PM

## 2021-05-14 NOTE — PROGRESS NOTES
Progress Note      Patient Name: Shreya Marino   Patient ID: [id number]   Sex: Female   YOB: 1945    Primary Care Provider: Alvino Mosley PA-C   Referring Provider: Alvino Mosley PA-C    Visit Date: April 29, 2021    Provider: Alvino Mosley PA-C   Location: [facility]   Location Address: [address] [place] [address], [address]  [place], [place]  [id number]   Location Phone: [phone]          Chief Complaint  · 3 month follow up      History Of Present Illness  Shreya Marino is a 75 year old /White female who presents for evaluation and treatment of: 3 month follow up      Pt presents today for a 3 month follow up.     Pt c/o urinary incontinence on occasion, she feels as if she cannot make it to the bathroom in time. She was on Oxybutynin in the past but discontinued use due to no refills.     Pt also c/o fluid retention in both ankles. She is wearing support socks but noted she cannot wear them in the summer due to the heat.     Pt c/o an itchy, red patch on palm of (L) hand. She noticed this about a week ago. Pt reports she has had this in the past and stated it went away on its own.     Labs-3/2021  Colonoscopy-12/2017  Mammo-2/26/21    Pt has had both COVID-19 vaccines at [facility].       Past Medical History  Disease Name Date Onset Notes   Biceps tendinitis of right upper extremity 04/25/2018 --    CAD (coronary artery disease) 07/06/2016 --    Chest Pain --  --    Heart Attack --  --    Heartburn --  --    Hyperlipidemia 01/28/2021 --    Nasal fracture --  --    Right Rotator cuff tear 04/25/2018 --    RIght shoulder pain --  --    SLAP tear of shoulder 04/25/2018 --    Subacromial impingement 04/25/2018 --    Subacromial impingement of right shoulder 04/18/2018 --    Vitamin D deficiency 01/28/2021 --          Past Surgical History  Procedure Name Date Notes   Breast biopsy, left breast --  --    Cataract surgery --  --    Closed reduction, fracture, nasal septum  07/10/18 --    Hysterectomy --  --    Stent placment --  --          Medication List  Name Date Started Instructions   aspirin 81 mg oral tablet,delayed release (DR/EC)  take 1 tablet (81 mg) by oral route once daily   biotin 5,000 mcg oral tablet,disintegrating  dissolve 1 tablet by oral route daily   Caltrate 600 + D 600 mg (1,500 mg)-800 unit oral tablet,chewable  chew 1 tablet by oral route 2 times a day   ezetimibe 10 mg oral tablet 04/29/2021 TAKE 1/2 TABLET BY MOUTH DAILY   hydrochlorothiazide 12.5 mg oral tablet 02/04/2021 take 1 tablet (12.5 mg) by oral route once daily   Livalo 1 mg oral tablet 12/10/2020 Take 1 tablet by mouth once daily   Nitrostat 0.4 mg sublingual tablet, sublingual 09/08/2017 DISSOLVE 1 TABLET UNDER TONGUE FOR CHEST PAIN - IF PAIN REMAINS AFTER 5 MIN, CALL 911 AND REPEAT DOSE. MAX 3 TABLETS IN 15 MINUTES.   One-A-Day Womens Formula 18 mg iron-400 mcg-500 mg Ca oral tablet  take 1 tablet by oral route daily   Vitamin D3 1,000 unit oral capsule  take 1 capsule by oral route daily         Allergy List  Allergen Name Date Reaction Notes   NO KNOWN DRUG ALLERGIES --  --  --        Allergies Reconciled  Family Medical History  Disease Name Relative/Age Notes   Breast Neoplasm, Malignant  --    Heart Disease  --    Cancer, Unspecified  --          Social History  Finding Status Start/Stop Quantity Notes   lives with spouse --  --/-- --  --    Retired --  --/-- --  --    Tobacco Never --/-- --  --          Immunizations  NameDate Admin Mfg Trade Name Lot Number Route Inj VIS Given VIS Publication   [id number]/01/2020 SKB Fluzone Quadrivalent  NE NE 10/01/2020    Comments: drew         Review of Systems  · Constitutional  o Denies  o : fever, fatigue, weight loss, weight gain  · Cardiovascular  o Denies  o : lower extremity edema, claudication, chest pressure, palpitations  · Respiratory  o Denies  o : shortness of breath, wheezing, cough, hemoptysis, dyspnea on  "exertion  · Gastrointestinal  o Denies  o : nausea, vomiting, diarrhea, constipation, abdominal pain      Vitals  Date Time BP Position Site L\R Cuff Size HR RR TEMP (F) WT  HT  BMI kg/m2 BSA m2 O2 Sat FR L/min FiO2 HC       04/29/2021 01:54 /86 Sitting    81 - R   176lbs 9.6oz 5'  5\" 29.39 1.92 96 %            Physical Examination  · Constitutional  o Appearance  o : well developed, well-nourished, no acute distress  · Head and Face  o Head  o : normocephalic, atraumatic  · Neck  o Inspection/Palpation  o : normal appearance, no masses or tenderness, trachea midline  o Thyroid  o : gland size normal, nontender, no nodules or masses present on palpation  · Respiratory  o Respiratory Effort  o : breathing unlabored  o Inspection of Chest  o : chest rise symmetric bilaterally  o Auscultation of Lungs  o : diminished breath sounds present    · Cardiovascular  o Heart  o :   § Auscultation of Heart  § : regular rate and rhythm, no murmurs, gallops or rubs  o Peripheral Vascular System  o :   § Extremities  § : mild lower extremity edema present, no cyanosis, no distal hair loss, normal capillary refill  · Lymphatic  o Neck  o : no cervical lymphadenopathy, no supraclavicular lymphadenopathy  · Psychiatric  o Mood and Affect  o : mood normal, affect appropriate     SKIN - erythematous left palmar surface of hand           Assessment  · Dermatitis     692.9/L30.9  · Hyperlipidemia     272.4/E78.5  · Vitamin D deficiency     268.9/E55.9  · CAD (coronary artery disease)     414.00/I25.10  · Fluid retention in legs     782.3/R60.0  · Urinary incontinence     788.30/R32      Plan  · Orders  o ACO-39: Current medications updated and reviewed (1159F, ) - - 04/29/2021  · Medications  o triamcinolone acetonide 0.1 % topical ointment   SIG: apply a thin layer to the affected area(s) by topical route 2 times per day   DISP: (1) Tube with 0 refills  Prescribed on 04/29/2021     o oxybutynin chloride 5 mg oral tablet " extended release 24hr   SIG: take 1 tablet (5 mg) by oral route once daily   DISP: (90) Tablet with 3 refills  Prescribed on 04/29/2021     o Medications have been Reconciled  o Transition of Care or Provider Policy  · Instructions  o Advised that cheeses and other sources of dairy fats, animal fats, fast food, and the extras (candy, pastries, pies, doughnuts and cookies) all contain LDL raising nutrients. Advised to increase fruits, vegetables, whole grains, and to monitor portion sizes.   o Patient was educated/instructed on their diagnosis, treatment and medications prior to discharge from the clinic today.            Electronically Signed by: Alvino Mosley PA-C -Author on April 29, 2021 07:49:08 PM

## 2021-05-15 VITALS
HEART RATE: 76 BPM | DIASTOLIC BLOOD PRESSURE: 90 MMHG | SYSTOLIC BLOOD PRESSURE: 126 MMHG | WEIGHT: 179 LBS | HEIGHT: 65 IN | BODY MASS INDEX: 29.82 KG/M2

## 2021-05-15 VITALS
HEIGHT: 65 IN | DIASTOLIC BLOOD PRESSURE: 102 MMHG | WEIGHT: 174 LBS | HEART RATE: 62 BPM | BODY MASS INDEX: 28.99 KG/M2 | SYSTOLIC BLOOD PRESSURE: 152 MMHG

## 2021-05-16 VITALS
SYSTOLIC BLOOD PRESSURE: 114 MMHG | HEART RATE: 68 BPM | HEIGHT: 65 IN | DIASTOLIC BLOOD PRESSURE: 64 MMHG | BODY MASS INDEX: 27.16 KG/M2 | WEIGHT: 163 LBS

## 2021-05-16 VITALS — RESPIRATION RATE: 16 BRPM | WEIGHT: 176 LBS | BODY MASS INDEX: 29.32 KG/M2 | HEIGHT: 65 IN

## 2021-05-16 VITALS — RESPIRATION RATE: 16 BRPM | HEIGHT: 65 IN | BODY MASS INDEX: 29.32 KG/M2 | WEIGHT: 176 LBS

## 2021-05-16 VITALS
WEIGHT: 158.25 LBS | RESPIRATION RATE: 18 BRPM | DIASTOLIC BLOOD PRESSURE: 72 MMHG | SYSTOLIC BLOOD PRESSURE: 120 MMHG | HEIGHT: 65 IN | TEMPERATURE: 97.6 F | OXYGEN SATURATION: 98 % | HEART RATE: 71 BPM | BODY MASS INDEX: 26.37 KG/M2

## 2021-05-16 VITALS
HEART RATE: 76 BPM | DIASTOLIC BLOOD PRESSURE: 74 MMHG | WEIGHT: 176 LBS | HEIGHT: 65 IN | BODY MASS INDEX: 29.32 KG/M2 | SYSTOLIC BLOOD PRESSURE: 116 MMHG

## 2021-05-16 VITALS — RESPIRATION RATE: 18 BRPM | HEIGHT: 65 IN | WEIGHT: 192 LBS | BODY MASS INDEX: 31.99 KG/M2

## 2021-05-16 VITALS
BODY MASS INDEX: 26.08 KG/M2 | HEART RATE: 73 BPM | OXYGEN SATURATION: 100 % | DIASTOLIC BLOOD PRESSURE: 64 MMHG | WEIGHT: 156.5 LBS | SYSTOLIC BLOOD PRESSURE: 115 MMHG | HEIGHT: 65 IN

## 2021-05-16 VITALS — BODY MASS INDEX: 26.7 KG/M2 | TEMPERATURE: 98 F | WEIGHT: 160.25 LBS | HEIGHT: 65 IN

## 2021-05-24 ENCOUNTER — HOSPITAL ENCOUNTER (OUTPATIENT)
Dept: LAB | Facility: HOSPITAL | Age: 76
Discharge: HOME | End: 2021-05-24
Attending: PODIATRIST

## 2021-05-24 LAB
ALBUMIN SERPL-MCNC: 4.1 G/DL (ref 3.5–5)
ALP SERPL-CCNC: 69 U/L (ref 43–160)
ALT SERPL-CCNC: 25 U/L (ref 10–40)
AST SERPL-CCNC: 25 U/L (ref 15–50)
BILIRUB SERPL-MCNC: 0.41 MG/DL (ref 0.2–1.3)
CONV BILI, CONJUGATED: <0.2 MG/DL (ref 0–0.6)
CONV TOTAL PROTEIN: 6.8 G/DL (ref 6.3–8.2)
CONV UNCONJUGATED BILIRUBIN: 0.2 MG/DL (ref 0–1.1)

## 2021-07-08 ENCOUNTER — TRANSCRIBE ORDERS (OUTPATIENT)
Dept: CARDIOLOGY | Facility: CLINIC | Age: 76
End: 2021-07-08

## 2021-07-08 ENCOUNTER — LAB (OUTPATIENT)
Dept: LAB | Facility: HOSPITAL | Age: 76
End: 2021-07-08

## 2021-07-08 DIAGNOSIS — Z79.899 ENCOUNTER FOR LONG-TERM (CURRENT) USE OF OTHER MEDICATIONS: ICD-10-CM

## 2021-07-08 DIAGNOSIS — Z79.899 ENCOUNTER FOR LONG-TERM (CURRENT) USE OF OTHER MEDICATIONS: Primary | ICD-10-CM

## 2021-07-08 LAB
ALBUMIN SERPL-MCNC: 4.2 G/DL (ref 3.5–5.2)
ALBUMIN/GLOB SERPL: 1.7 G/DL
ALP SERPL-CCNC: 83 U/L (ref 39–117)
ALT SERPL W P-5'-P-CCNC: 28 U/L (ref 1–33)
ANION GAP SERPL CALCULATED.3IONS-SCNC: 12.2 MMOL/L (ref 5–15)
AST SERPL-CCNC: 21 U/L (ref 1–32)
BILIRUB SERPL-MCNC: 0.4 MG/DL (ref 0–1.2)
BUN SERPL-MCNC: 16 MG/DL (ref 8–23)
BUN/CREAT SERPL: 24.2 (ref 7–25)
CALCIUM SPEC-SCNC: 10 MG/DL (ref 8.6–10.5)
CHLORIDE SERPL-SCNC: 105 MMOL/L (ref 98–107)
CHOLEST SERPL-MCNC: 186 MG/DL (ref 0–200)
CO2 SERPL-SCNC: 23.8 MMOL/L (ref 22–29)
CREAT SERPL-MCNC: 0.66 MG/DL (ref 0.57–1)
GFR SERPL CREATININE-BSD FRML MDRD: 87 ML/MIN/1.73
GLOBULIN UR ELPH-MCNC: 2.5 GM/DL
GLUCOSE SERPL-MCNC: 93 MG/DL (ref 65–99)
HDLC SERPL-MCNC: 41 MG/DL (ref 40–60)
LDLC SERPL CALC-MCNC: 104 MG/DL (ref 0–100)
LDLC/HDLC SERPL: 2.39 {RATIO}
POTASSIUM SERPL-SCNC: 3.9 MMOL/L (ref 3.5–5.2)
PROT SERPL-MCNC: 6.7 G/DL (ref 6–8.5)
SODIUM SERPL-SCNC: 141 MMOL/L (ref 136–145)
TRIGL SERPL-MCNC: 236 MG/DL (ref 0–150)
VLDLC SERPL-MCNC: 41 MG/DL (ref 5–40)

## 2021-07-08 PROCEDURE — 80053 COMPREHEN METABOLIC PANEL: CPT

## 2021-07-08 PROCEDURE — 80061 LIPID PANEL: CPT

## 2021-07-08 PROCEDURE — 36415 COLL VENOUS BLD VENIPUNCTURE: CPT

## 2021-07-14 ENCOUNTER — TELEPHONE (OUTPATIENT)
Dept: FAMILY MEDICINE CLINIC | Facility: CLINIC | Age: 76
End: 2021-07-14

## 2021-07-14 NOTE — TELEPHONE ENCOUNTER
Caller: KYRIE HEATON    Relationship: Self    Best call back number:[phone]    What is the best time to reach you: ANY    Who are you requesting to speak with (clinical staff, provider,  specific staff member): PROVIDER    What was the call regarding: KYRIE HEATON WHOM STATED HE WAS A PHARMACIST WITH A PHARMACY BEING UTILIZED BY THE PATIENT CALLED BECAUSE HE STATED THAT FOLLOWING A CONVERSATION HE HAD WITH THE PATIENT TODAY HE IS CONCERNED THAT THE PATIENT MAY BE DEVELOPING DEMENTIA FROM ONE OF HER MEDICATIONS. HE PROVIDED HIS PERSONAL LINE IN ORDER TO GET BACK IN TOUCH WITH HIM TO DISCUSS HIS CONCERNS     Do you require a callback: YES

## 2021-07-16 ENCOUNTER — TELEPHONE (OUTPATIENT)
Dept: CARDIOLOGY | Facility: CLINIC | Age: 76
End: 2021-07-16

## 2021-07-16 NOTE — TELEPHONE ENCOUNTER
Spoke with maico, he is in [place] as part of [facility] clinical headquarters. He was speaking to her about her medications. livilo and oxybutynin. He notes that these can cause fuzzy memory and when talking to her he felt that she was not congetivly all there and recommends that we do testing to check for dementia.  She does have an upcoming appt next month

## 2021-07-18 PROBLEM — E55.9 VITAMIN D DEFICIENCY: Status: ACTIVE | Noted: 2021-01-28

## 2021-07-18 PROBLEM — M75.21 BICEPS TENDINITIS OF RIGHT UPPER EXTREMITY: Status: ACTIVE | Noted: 2018-04-25

## 2021-07-18 PROBLEM — R12 HEARTBURN: Status: ACTIVE | Noted: 2021-07-18

## 2021-07-18 PROBLEM — E78.5 HYPERLIPIDEMIA: Status: ACTIVE | Noted: 2021-01-28

## 2021-07-18 PROBLEM — S02.2XXA NASAL FRACTURE: Status: ACTIVE | Noted: 2021-07-18

## 2021-07-18 PROBLEM — M75.101 RIGHT ROTATOR CUFF TEAR: Status: ACTIVE | Noted: 2018-04-25

## 2021-07-18 PROBLEM — I21.9 HEART ATTACK: Status: ACTIVE | Noted: 2021-07-18

## 2021-07-18 RX ORDER — OXYBUTYNIN CHLORIDE 5 MG/1
5 TABLET, EXTENDED RELEASE ORAL DAILY
COMMUNITY
Start: 2021-04-29 | End: 2022-03-22 | Stop reason: SDUPTHER

## 2021-07-18 RX ORDER — HYDROQUINONE 40 MG/G
CREAM TOPICAL
COMMUNITY
Start: 2021-06-02 | End: 2022-03-22

## 2021-07-18 RX ORDER — HYDROCHLOROTHIAZIDE 12.5 MG/1
TABLET ORAL
COMMUNITY
Start: 2021-02-04 | End: 2021-10-21 | Stop reason: SDUPTHER

## 2021-07-18 RX ORDER — ATORVASTATIN CALCIUM 20 MG/1
TABLET, FILM COATED ORAL
COMMUNITY
End: 2021-07-18

## 2021-07-18 RX ORDER — EZETIMIBE 10 MG/1
10 TABLET ORAL DAILY
COMMUNITY
Start: 2021-07-02 | End: 2021-10-21 | Stop reason: SDUPTHER

## 2021-07-18 RX ORDER — ASPIRIN 81 MG/1
TABLET ORAL
COMMUNITY

## 2021-07-18 RX ORDER — CARVEDILOL 3.12 MG/1
TABLET ORAL
COMMUNITY
End: 2021-10-21

## 2021-07-29 ENCOUNTER — TELEPHONE (OUTPATIENT)
Dept: CARDIOLOGY | Facility: CLINIC | Age: 76
End: 2021-07-29

## 2021-07-29 NOTE — TELEPHONE ENCOUNTER
----- Message from KOLE Garcia sent at 7/11/2021  6:42 PM EDT -----  Cholesterol is unchanged. Did she start Repatha?

## 2021-07-30 ENCOUNTER — TELEPHONE (OUTPATIENT)
Dept: CARDIOLOGY | Facility: CLINIC | Age: 76
End: 2021-07-30

## 2021-08-04 NOTE — TELEPHONE ENCOUNTER
Informed patient to continue her same medications until her appointment in Oct. She had questions about how often she should take her Zetia medication. I informed her that Dr. Byrd's last office note (04/05/21) stated that she should take 1/2 tablet daily. There was no note after that visit.

## 2021-09-13 RX ORDER — PITAVASTATIN CALCIUM 1.04 MG/1
TABLET, FILM COATED ORAL
Qty: 90 TABLET | Refills: 1 | Status: SHIPPED | OUTPATIENT
Start: 2021-09-13 | End: 2021-10-21 | Stop reason: SDUPTHER

## 2021-09-14 RX ORDER — PITAVASTATIN CALCIUM 1.04 MG/1
TABLET, FILM COATED ORAL
Qty: 30 TABLET | OUTPATIENT
Start: 2021-09-14

## 2021-10-14 ENCOUNTER — LAB (OUTPATIENT)
Dept: LAB | Facility: HOSPITAL | Age: 76
End: 2021-10-14

## 2021-10-14 ENCOUNTER — TRANSCRIBE ORDERS (OUTPATIENT)
Dept: CARDIOLOGY | Facility: CLINIC | Age: 76
End: 2021-10-14

## 2021-10-14 DIAGNOSIS — Z79.899 ENCOUNTER FOR LONG-TERM (CURRENT) USE OF OTHER MEDICATIONS: ICD-10-CM

## 2021-10-14 DIAGNOSIS — E03.9 HYPOTHYROIDISM, ADULT: ICD-10-CM

## 2021-10-14 DIAGNOSIS — E03.9 HYPOTHYROIDISM, ADULT: Primary | ICD-10-CM

## 2021-10-14 LAB
ALBUMIN SERPL-MCNC: 4.4 G/DL (ref 3.5–5.2)
ALBUMIN/GLOB SERPL: 2 G/DL
ALP SERPL-CCNC: 64 U/L (ref 39–117)
ALT SERPL W P-5'-P-CCNC: 27 U/L (ref 1–33)
ANION GAP SERPL CALCULATED.3IONS-SCNC: 9.4 MMOL/L (ref 5–15)
AST SERPL-CCNC: 19 U/L (ref 1–32)
BILIRUB SERPL-MCNC: 0.4 MG/DL (ref 0–1.2)
BUN SERPL-MCNC: 12 MG/DL (ref 8–23)
BUN/CREAT SERPL: 17.6 (ref 7–25)
CALCIUM SPEC-SCNC: 10.2 MG/DL (ref 8.6–10.5)
CHLORIDE SERPL-SCNC: 104 MMOL/L (ref 98–107)
CHOLEST SERPL-MCNC: 194 MG/DL (ref 0–200)
CO2 SERPL-SCNC: 28.6 MMOL/L (ref 22–29)
CREAT SERPL-MCNC: 0.68 MG/DL (ref 0.57–1)
GFR SERPL CREATININE-BSD FRML MDRD: 84 ML/MIN/1.73
GLOBULIN UR ELPH-MCNC: 2.2 GM/DL
GLUCOSE SERPL-MCNC: 93 MG/DL (ref 65–99)
HDLC SERPL-MCNC: 44 MG/DL (ref 40–60)
LDLC SERPL CALC-MCNC: 115 MG/DL (ref 0–100)
LDLC/HDLC SERPL: 2.49 {RATIO}
POTASSIUM SERPL-SCNC: 4.1 MMOL/L (ref 3.5–5.2)
PROT SERPL-MCNC: 6.6 G/DL (ref 6–8.5)
SODIUM SERPL-SCNC: 142 MMOL/L (ref 136–145)
T4 FREE SERPL-MCNC: 1.47 NG/DL (ref 0.93–1.7)
TRIGL SERPL-MCNC: 202 MG/DL (ref 0–150)
TSH SERPL DL<=0.05 MIU/L-ACNC: 2.95 UIU/ML (ref 0.27–4.2)
VLDLC SERPL-MCNC: 35 MG/DL (ref 5–40)

## 2021-10-14 PROCEDURE — 80053 COMPREHEN METABOLIC PANEL: CPT

## 2021-10-14 PROCEDURE — 36415 COLL VENOUS BLD VENIPUNCTURE: CPT

## 2021-10-14 PROCEDURE — 84439 ASSAY OF FREE THYROXINE: CPT

## 2021-10-14 PROCEDURE — 80061 LIPID PANEL: CPT

## 2021-10-14 PROCEDURE — 84443 ASSAY THYROID STIM HORMONE: CPT

## 2021-10-20 PROBLEM — I10 HYPERTENSION, ESSENTIAL: Status: ACTIVE | Noted: 2021-10-20

## 2021-10-21 ENCOUNTER — OFFICE VISIT (OUTPATIENT)
Dept: CARDIOLOGY | Facility: CLINIC | Age: 76
End: 2021-10-21

## 2021-10-21 VITALS
BODY MASS INDEX: 29.32 KG/M2 | WEIGHT: 176 LBS | HEIGHT: 65 IN | DIASTOLIC BLOOD PRESSURE: 74 MMHG | SYSTOLIC BLOOD PRESSURE: 106 MMHG | HEART RATE: 79 BPM

## 2021-10-21 DIAGNOSIS — E78.5 HYPERLIPIDEMIA, UNSPECIFIED HYPERLIPIDEMIA TYPE: ICD-10-CM

## 2021-10-21 DIAGNOSIS — I25.10 ATHEROSCLEROSIS OF CORONARY ARTERY OF NATIVE HEART WITHOUT ANGINA PECTORIS, UNSPECIFIED VESSEL OR LESION TYPE: Primary | Chronic | ICD-10-CM

## 2021-10-21 DIAGNOSIS — I10 HYPERTENSION, ESSENTIAL: ICD-10-CM

## 2021-10-21 PROCEDURE — 99214 OFFICE O/P EST MOD 30 MIN: CPT | Performed by: NURSE PRACTITIONER

## 2021-10-21 PROCEDURE — 93000 ELECTROCARDIOGRAM COMPLETE: CPT | Performed by: INTERNAL MEDICINE

## 2021-10-21 RX ORDER — HYDROCHLOROTHIAZIDE 12.5 MG/1
12.5 TABLET ORAL DAILY
Qty: 90 TABLET | Refills: 3 | Status: SHIPPED | OUTPATIENT
Start: 2021-10-21 | End: 2022-12-05 | Stop reason: SDUPTHER

## 2021-10-21 RX ORDER — EZETIMIBE 10 MG/1
10 TABLET ORAL DAILY
Qty: 90 TABLET | Refills: 3 | Status: SHIPPED | OUTPATIENT
Start: 2021-10-21 | End: 2022-11-17 | Stop reason: SDUPTHER

## 2021-10-21 NOTE — PROGRESS NOTES
Chief Complaint  Hyperlipidemia (F/u post labs), H/o heart attack, and Atherosclerosis of coronary artery    Subjective            Shreya Marino presents to [facility] CARDIOLOGY  She is a 76-year-old white female comes in to evaluate her coronary disease, hypertension, hyperlipidemia.  Still complains of issues with her bowels.  Which she says are stabilized.  She follows a very good low-fat cholesterol diet.Denies any chest pains, shortness of breath, palpitations, dizziness, syncope, swelling, PND, or orthopnea.  Cardiac wise she has no further complaints.  He is lost 4 pounds since her last visit.  She does not want to take Repatha or any shots for her cholesterol.  Has had both covered vaccines.  She is having some issues with a rash between her breasts and thinks it is shingles.                Past History:    Past Medical History:   Diagnosis Date   • Atherosclerosis of coronary artery 7/6/2016    with previous stent/PCI (June 2016);    • Biceps tendinitis of right upper extremity 04/25/2018   • CAD (coronary artery disease) 07/06/2016   • Chest pain    • Heart attack (HCC)    • Heart burn    • HLD (hyperlipidemia) 01/28/2021   • Nasal fracture    • Right rotator cuff tear 04/25/2018   • Right shoulder pain    • SLAP tear of shoulder 04/25/2018   • Subacromial impingement of right shoulder 04/18/2018   • Vitamin D deficiency 01/28/2021        Family History: family history includes Breast cancer in an other family member; Cancer in an other family member; Heart disease in an other family member.     Social History: reports that she has never smoked. She has never used smokeless tobacco. She reports that she does not drink alcohol and does not use drugs.    Allergies: Patient has no known allergies.      Past Surgical History:   Procedure Laterality Date   • BREAST BIOPSY Left    • CATARACT EXTRACTION     • CORONARY ANGIOPLASTY WITH STENT PLACEMENT     • HYSTERECTOMY     • NASAL SEPTUM  SURGERY  07/10/2018    CLOSED REDUCTION, FRACTURE        Prior to Admission medications    Medication Sig Start Date End Date Taking? Authorizing Provider   aspirin (aspirin) 81 MG EC tablet aspirin 81 mg oral tablet,delayed release (DR/EC) take 1 tablet (81 mg) by oral route once daily   Active   Yes Maria Dolores Gómez MD   Biotin 5 MG tablet dispersible biotin 5,000 mcg oral tablet,disintegrating dissolve  1 tablet by oral route daily   Active   Yes Maria Dolores Gómez MD   Calcium Carb-Cholecalciferol (Caltrate 600+D3 Soft) 600-800 MG-UNIT chewable tablet Caltrate 600 + D 600 mg (1,500 mg)-800 unit oral tablet,chewable chew 1 tablet by oral route 2 times a day   Active   Yes Maria Dolores Gómez MD   Cholecalciferol 25 MCG (1000 UT) capsule Vitamin D3 1,000 unit oral capsule take 1 capsule by oral route daily   Active   Yes Maria Dolores Gómez MD   ezetimibe (ZETIA) 10 MG tablet Take 10 mg by mouth Daily. 7/2/21  Yes Maria Dolores Gómez MD   hydroCHLOROthiazide (HYDRODIURIL) 12.5 MG tablet hydrochlorothiazide 12.5 mg oral tablet take 1 tablet (12.5 mg) by oral route once daily 2/4/2021  Active 2/4/21  Yes Maria Dolores Gómez MD   hydroquinone 4 % cream  6/2/21  Yes Maria Dolores Gómez MD   Livalo 1 MG tablet tablet TAKE ONE TABLET BY MOUTH DAILY 9/13/21  Yes Cora Albarran APRN   oxybutynin XL (DITROPAN-XL) 5 MG 24 hr tablet Take 5 mg by mouth Daily. 4/29/21  Yes Maria Dolores Gómez MD   triamcinolone (KENALOG) 0.1 % ointment  4/29/21  Yes Maria Dolores Gómez MD   carvedilol (COREG) 3.125 MG tablet carvedilol 3.125 mg oral tablet take 1 tablet (3.125 mg) by oral route 2 times per day with food   Suspended    Maria Dolores Gómez MD   ticagrelor (Brilinta) 60 MG tablet tablet Brilinta 60 mg oral tablet take 1 tablet (60 mg) by oral route 2 times per day   Suspended    Maria Dolores Gómez MD        Review of Systems   Constitutional: Positive for fatigue.   Respiratory: Positive for cough.  "Negative for shortness of breath.    Cardiovascular: Negative for chest pain, palpitations and leg swelling.   All other systems reviewed and are negative.       Objective     Physical Exam  Constitutional:       Appearance: She is obese.   Cardiovascular:      Rate and Rhythm: Regular rhythm.      Heart sounds: Normal heart sounds.   Pulmonary:      Effort: Pulmonary effort is normal.      Breath sounds: Normal breath sounds.   Musculoskeletal:      Right lower leg: No edema.      Left lower leg: No edema.   Neurological:      Mental Status: She is alert.       /74   Pulse 79   Ht 165.1 cm (65\")   Wt 79.8 kg (176 lb)   BMI 29.29 kg/m²       Vitals:    10/21/21 1024   BP: 106/74   Pulse: 79       Result Review :         The following data was reviewed by: KOLE Garcia on 10/21/2021:      No results found for: PROBNP, BNP  CMP    CMP 5/24/21 7/8/21 10/14/21   Glucose  93 93   BUN  16 12   Creatinine  0.66 0.68   eGFR Non African Am  87 84   Sodium  141 142   Potassium  3.9 4.1   Chloride  105 104   Calcium  10.0 10.2   Albumin 4.1 4.20 4.40   Total Bilirubin 0.41 0.4 0.4   Alkaline Phosphatase 69 83 64   AST (SGOT) 25 21 19   ALT (SGPT) 25 28 27              Lipid Panel    Lipid Panel 3/15/21 7/8/21 10/14/21   Total Cholesterol  186 194   Total Cholesterol 194     Triglycerides 176 (A) 236 (A) 202 (A)   HDL Cholesterol 50 41 44   VLDL Cholesterol 35 41 (A) 35   LDL Cholesterol  109 (A) 104 (A) 115 (A)   LDL/HDL Ratio  2.39 2.49   (A) Abnormal value       Comments are available for some flowsheets but are not being displayed.            Lab Results   Component Value Date    TSH 2.950 10/14/2021    TSH 3.590 09/01/2020    TSH 3.750 10/21/2019      Lab Results   Component Value Date    FREET4 1.47 10/14/2021    FREET4 1.5 09/01/2020    FREET4 1.4 10/21/2019             ECG 12 Lead    Date/Time: 10/21/2021 7:12 PM  Performed by: Zulema Byrd MD  Authorized by: Zulema Byrd MD   Comments: Sinus " rhythm.  Borderline left axis deviation.  Low voltage precordial leads.  No change compared to EKG of 9/10/2020                Assessment and Plan        Diagnoses and all orders for this visit:    1. Atherosclerosis of coronary artery of native heart without angina pectoris, unspecified vessel or lesion type (Primary)  Assessment & Plan:  Without angina.  Continue aspirin 81 mg a day.      2. Hyperlipidemia, unspecified hyperlipidemia type  Assessment & Plan:  LDL not to goal.  Patient has declined Repatha.  Discussed changing Zetia to Nexlizet.  However she does not want to to do that at this time.  So continue Zetia 10 mg half a tab a day and Livalo 1 mg a day.     Orders:  -     pitavastatin calcium (Livalo) 1 MG tablet tablet; Take 1 tablet by mouth Daily.  Dispense: 90 tablet; Refill: 3  -     ezetimibe (ZETIA) 10 MG tablet; Take 1 tablet by mouth Daily.  Dispense: 90 tablet; Refill: 3  -     Lipid Panel; Future  -     Comprehensive Metabolic Panel; Future    3. Hypertension, essential  Assessment & Plan:  Controlled.  Continue hydrochlorothiazide 12.5 mg a day.    Orders:  -     hydroCHLOROthiazide (HYDRODIURIL) 12.5 MG tablet; Take 1 tablet by mouth Daily.  Dispense: 90 tablet; Refill: 3  -     Comprehensive Metabolic Panel; Future  -     Magnesium; Future            Follow Up     Return in about 6 months (around 4/21/2022) for with Dr. Byrd.    Patient was given instructions and counseling regarding her condition or for health maintenance advice. Please see specific information pulled into the AVS if appropriate.       KOLE Maki  10/21/21 10:28 EDT

## 2021-10-21 NOTE — ASSESSMENT & PLAN NOTE
LDL not to goal.  Patient has declined Repatha.  Discussed changing Zetia to Nexlizet.  However she does not want to to do that at this time.  So continue Zetia 10 mg half a tab a day and Livalo 1 mg a day.

## 2021-12-14 ENCOUNTER — TELEPHONE (OUTPATIENT)
Dept: CARDIOLOGY | Facility: CLINIC | Age: 76
End: 2021-12-14

## 2021-12-14 NOTE — TELEPHONE ENCOUNTER
Received VM from patient.    Returned call. Patient stated that she is having left arm pain that radiates to her fingers. Stated that it is worse when she lays on that arm and when she uses that arm. Patient denies chest pain and denies SOB.     Advised patient that it sounds more muscle/nerve related and I would recommend discussing with Damon Mosley. However, will discuss with Dr. Byrd and call back with any further recommendations.

## 2022-03-22 ENCOUNTER — OFFICE VISIT (OUTPATIENT)
Dept: FAMILY MEDICINE CLINIC | Facility: CLINIC | Age: 77
End: 2022-03-22

## 2022-03-22 VITALS
BODY MASS INDEX: 28.12 KG/M2 | WEIGHT: 175 LBS | HEIGHT: 66 IN | OXYGEN SATURATION: 98 % | HEART RATE: 71 BPM | DIASTOLIC BLOOD PRESSURE: 71 MMHG | SYSTOLIC BLOOD PRESSURE: 125 MMHG

## 2022-03-22 DIAGNOSIS — Z00.00 MEDICARE ANNUAL WELLNESS VISIT, SUBSEQUENT: Primary | ICD-10-CM

## 2022-03-22 DIAGNOSIS — Z23 NEED FOR PNEUMOCOCCAL VACCINATION: ICD-10-CM

## 2022-03-22 DIAGNOSIS — R32 URINARY INCONTINENCE, UNSPECIFIED TYPE: ICD-10-CM

## 2022-03-22 DIAGNOSIS — Z12.31 ENCOUNTER FOR SCREENING MAMMOGRAM FOR MALIGNANT NEOPLASM OF BREAST: ICD-10-CM

## 2022-03-22 PROCEDURE — 1159F MED LIST DOCD IN RCRD: CPT | Performed by: PHYSICIAN ASSISTANT

## 2022-03-22 PROCEDURE — 96160 PT-FOCUSED HLTH RISK ASSMT: CPT | Performed by: PHYSICIAN ASSISTANT

## 2022-03-22 PROCEDURE — 1170F FXNL STATUS ASSESSED: CPT | Performed by: PHYSICIAN ASSISTANT

## 2022-03-22 PROCEDURE — G0009 ADMIN PNEUMOCOCCAL VACCINE: HCPCS | Performed by: PHYSICIAN ASSISTANT

## 2022-03-22 PROCEDURE — 90732 PPSV23 VACC 2 YRS+ SUBQ/IM: CPT | Performed by: PHYSICIAN ASSISTANT

## 2022-03-22 PROCEDURE — G0439 PPPS, SUBSEQ VISIT: HCPCS | Performed by: PHYSICIAN ASSISTANT

## 2022-03-22 RX ORDER — OXYBUTYNIN CHLORIDE 5 MG/1
5 TABLET, EXTENDED RELEASE ORAL DAILY
Qty: 90 TABLET | Refills: 1 | Status: SHIPPED | OUTPATIENT
Start: 2022-03-22 | End: 2022-11-29 | Stop reason: SDUPTHER

## 2022-03-22 NOTE — PROGRESS NOTES
The ABCs of the Annual Wellness Visit  Subsequent Medicare Wellness Visit    Chief Complaint   Patient presents with   • Medicare Wellness-subsequent     AWV      Subjective    History of Present Illness:  Shreya Marino is a 76 y.o. female who presents for a Subsequent Medicare Wellness Visit.    The following portions of the patient's history were reviewed and   updated as appropriate: allergies, past family history, past medical history, past social history, past surgical history and problem list.    Compared to one year ago, the patient feels her physical   health is the same.    Compared to one year ago, the patient feels her mental   health is the same.    Recent Hospitalizations:  She was not admitted to the hospital during the last year.       Current Medical Providers:  Patient Care Team:  Damon Mosley PA as PCP - General (Physician Assistant)    Outpatient Medications Prior to Visit   Medication Sig Dispense Refill   • aspirin 81 MG EC tablet aspirin 81 mg oral tablet,delayed release (DR/EC) take 1 tablet (81 mg) by oral route once daily   Active     • Calcium Carb-Cholecalciferol (Caltrate 600+D3 Soft) 600-800 MG-UNIT chewable tablet Caltrate 600 + D 600 mg (1,500 mg)-800 unit oral tablet,chewable chew 1 tablet by oral route 2 times a day   Active     • Cholecalciferol 25 MCG (1000 UT) capsule Vitamin D3 1,000 unit oral capsule take 1 capsule by oral route daily   Active     • ezetimibe (ZETIA) 10 MG tablet Take 1 tablet by mouth Daily. 90 tablet 3   • hydroCHLOROthiazide (HYDRODIURIL) 12.5 MG tablet Take 1 tablet by mouth Daily. 90 tablet 3   • pitavastatin calcium (Livalo) 1 MG tablet tablet Take 1 tablet by mouth Daily. 90 tablet 3   • oxybutynin XL (DITROPAN-XL) 5 MG 24 hr tablet Take 5 mg by mouth Daily.     • Biotin 5 MG tablet dispersible biotin 5,000 mcg oral tablet,disintegrating dissolve  1 tablet by oral route daily   Active     • hydroquinone 4 % cream      • triamcinolone (KENALOG) 0.1 %  "ointment        No facility-administered medications prior to visit.       No opioid medication identified on active medication list. I have reviewed chart for other potential  high risk medication/s and harmful drug interactions in the elderly.          Aspirin is on active medication list. Aspirin use is indicated based on review of current medical condition/s. Pros and cons of this therapy have been discussed today. Benefits of this medication outweigh potential harm.  Patient has been encouraged to continue taking this medication.  .      Patient Active Problem List   Diagnosis   • Atherosclerosis of coronary artery   • Biceps tendinitis of right upper extremity   • Heart attack (HCC)   • Heartburn   • Hyperlipidemia   • Nasal fracture   • Right rotator cuff tear   • Vitamin D deficiency   • Hypertension, essential     Advance Care Planning  Advance Directive is not on file.  ACP discussion was held with the patient during this visit. Patient has an advance directive (not in EMR), copy requested. Patient does not have an advance directive, information provided.          Objective    Vitals:    03/22/22 1351   BP: 125/71   BP Location: Left arm   Pulse: 71   SpO2: 98%   Weight: 79.4 kg (175 lb)   Height: 166.4 cm (65.5\")     BMI Readings from Last 1 Encounters:   03/22/22 28.68 kg/m²   BMI is above normal parameters. Recommendations include: educational material    Does the patient have evidence of cognitive impairment? No              HEALTH RISK ASSESSMENT    Smoking Status:  Social History     Tobacco Use   Smoking Status Never Smoker   Smokeless Tobacco Never Used     Alcohol Consumption:  Social History     Substance and Sexual Activity   Alcohol Use Never     Fall Risk Screen:    JOSSELYNADI Fall Risk Assessment was completed, and patient is at LOW risk for falls.Assessment completed on:3/22/2022    Depression Screening:  PHQ-2/PHQ-9 Depression Screening 3/22/2022   Little Interest or Pleasure in Doing Things " 0-->not at all   Feeling Down, Depressed or Hopeless 1-->several days   PHQ-9: Brief Depression Severity Measure Score 1       Health Habits and Functional and Cognitive Screening:  Functional & Cognitive Status 3/22/2022   Do you have difficulty preparing food and eating? No   Do you have difficulty bathing yourself, getting dressed or grooming yourself? No   Do you have difficulty using the toilet? No   Do you have difficulty moving around from place to place? No   Do you have trouble with steps or getting out of a bed or a chair? No   Current Diet Well Balanced Diet   Dental Exam Up to date   Eye Exam Up to date   Exercise (times per week) 7 times per week   Do you need help using the phone?  No   Are you deaf or do you have serious difficulty hearing?  No   Do you need help with transportation? No   Do you need help shopping? No   Do you need help preparing meals?  No   Do you need help with housework?  No   Do you need help with laundry? No   Do you need help taking your medications? No   Do you need help managing money? No   Do you ever drive or ride in a car without wearing a seat belt? No   Have you felt unusual stress, anger or loneliness in the last month? No   Who do you live with? Spouse   If you need help, do you have trouble finding someone available to you? No   Do you have difficulty concentrating, remembering or making decisions? No       Age-appropriate Screening Schedule:  Refer to the list below for future screening recommendations based on patient's age, sex and/or medical conditions. Orders for these recommended tests are listed in the plan section. The patient has been provided with a written plan.    Health Maintenance   Topic Date Due   • TDAP/TD VACCINES (1 - Tdap) Never done   • ZOSTER VACCINE (1 of 2) Never done   • LIPID PANEL  10/14/2022   • DXA SCAN  02/26/2023   • INFLUENZA VACCINE  Completed              Assessment/Plan   CMS Preventative Services Quick Reference  Risk Factors  Identified During Encounter  None Identified  The above risks/problems have been discussed with the patient.  Follow up actions/plans if indicated are seen below in the Assessment/Plan Section.  Pertinent information has been shared with the patient in the After Visit Summary.    Diagnoses and all orders for this visit:    1. Medicare annual wellness visit, subsequent (Primary)    2. Encounter for screening mammogram for malignant neoplasm of breast  -     Mammo Screening Digital Tomosynthesis Bilateral With CAD; Future    3. Urinary incontinence, unspecified type  -     oxybutynin XL (DITROPAN-XL) 5 MG 24 hr tablet; Take 1 tablet by mouth Daily.  Dispense: 90 tablet; Refill: 1    4. Need for pneumococcal vaccination  -     pneumococcal polysaccharide 23-valent (PNEUMOVAX-23) vaccine 0.5 mL      Follow Up:   Return if symptoms worsen or fail to improve.     An After Visit Summary and PPPS were made available to the patient.            I have reviewed information obtained and documented by others and I have confirmed the accuracy of this documented note.    CHRISS Haywood

## 2022-03-24 ENCOUNTER — HOSPITAL ENCOUNTER (OUTPATIENT)
Dept: MAMMOGRAPHY | Facility: HOSPITAL | Age: 77
Discharge: HOME | End: 2022-03-24
Admitting: PHYSICIAN ASSISTANT

## 2022-03-24 DIAGNOSIS — Z12.31 ENCOUNTER FOR SCREENING MAMMOGRAM FOR MALIGNANT NEOPLASM OF BREAST: ICD-10-CM

## 2022-03-24 PROCEDURE — 77067 SCR MAMMO BI INCL CAD: CPT

## 2022-03-24 PROCEDURE — 77063 BREAST TOMOSYNTHESIS BI: CPT

## 2022-03-25 ENCOUNTER — TELEPHONE (OUTPATIENT)
Dept: FAMILY MEDICINE CLINIC | Facility: CLINIC | Age: 77
End: 2022-03-25

## 2022-03-25 NOTE — TELEPHONE ENCOUNTER
----- Message from CHRISS Haywood sent at 3/24/2022  5:13 PM EDT -----  Your mammogram did not show evidence of breast cancer.  Routine follow up yearly for mammograms is recommended.  Remember to perform your monthly self breast exams (SBE).  Notify me immediately if you know notice any abnormalities.

## 2022-04-28 ENCOUNTER — LAB (OUTPATIENT)
Dept: LAB | Facility: HOSPITAL | Age: 77
End: 2022-04-28

## 2022-04-28 DIAGNOSIS — I10 HYPERTENSION, ESSENTIAL: ICD-10-CM

## 2022-04-28 DIAGNOSIS — E78.5 HYPERLIPIDEMIA, UNSPECIFIED HYPERLIPIDEMIA TYPE: ICD-10-CM

## 2022-04-28 LAB
ALBUMIN SERPL-MCNC: 4.2 G/DL (ref 3.5–5.2)
ALBUMIN/GLOB SERPL: 1.6 G/DL
ALP SERPL-CCNC: 69 U/L (ref 39–117)
ALT SERPL W P-5'-P-CCNC: 35 U/L (ref 1–33)
ANION GAP SERPL CALCULATED.3IONS-SCNC: 11.8 MMOL/L (ref 5–15)
AST SERPL-CCNC: 26 U/L (ref 1–32)
BILIRUB SERPL-MCNC: 0.5 MG/DL (ref 0–1.2)
BUN SERPL-MCNC: 15 MG/DL (ref 8–23)
BUN/CREAT SERPL: 18.8 (ref 7–25)
CALCIUM SPEC-SCNC: 10.2 MG/DL (ref 8.6–10.5)
CHLORIDE SERPL-SCNC: 103 MMOL/L (ref 98–107)
CHOLEST SERPL-MCNC: 171 MG/DL (ref 0–200)
CO2 SERPL-SCNC: 26.2 MMOL/L (ref 22–29)
CREAT SERPL-MCNC: 0.8 MG/DL (ref 0.57–1)
EGFRCR SERPLBLD CKD-EPI 2021: 75.5 ML/MIN/1.73
GLOBULIN UR ELPH-MCNC: 2.6 GM/DL
GLUCOSE SERPL-MCNC: 95 MG/DL (ref 65–99)
HDLC SERPL-MCNC: 44 MG/DL (ref 40–60)
LDLC SERPL CALC-MCNC: 93 MG/DL (ref 0–100)
LDLC/HDLC SERPL: 2 {RATIO}
MAGNESIUM SERPL-MCNC: 2.1 MG/DL (ref 1.6–2.4)
POTASSIUM SERPL-SCNC: 4.1 MMOL/L (ref 3.5–5.2)
PROT SERPL-MCNC: 6.8 G/DL (ref 6–8.5)
SODIUM SERPL-SCNC: 141 MMOL/L (ref 136–145)
TRIGL SERPL-MCNC: 196 MG/DL (ref 0–150)
VLDLC SERPL-MCNC: 34 MG/DL (ref 5–40)

## 2022-04-28 PROCEDURE — 36415 COLL VENOUS BLD VENIPUNCTURE: CPT

## 2022-04-28 PROCEDURE — 80053 COMPREHEN METABOLIC PANEL: CPT

## 2022-04-28 PROCEDURE — 83735 ASSAY OF MAGNESIUM: CPT

## 2022-04-28 PROCEDURE — 80061 LIPID PANEL: CPT

## 2022-05-04 ENCOUNTER — OFFICE VISIT (OUTPATIENT)
Dept: CARDIOLOGY | Facility: CLINIC | Age: 77
End: 2022-05-04

## 2022-05-04 VITALS
DIASTOLIC BLOOD PRESSURE: 72 MMHG | HEART RATE: 79 BPM | HEIGHT: 66 IN | SYSTOLIC BLOOD PRESSURE: 139 MMHG | BODY MASS INDEX: 28.77 KG/M2 | WEIGHT: 179 LBS

## 2022-05-04 DIAGNOSIS — E78.5 HYPERLIPIDEMIA, UNSPECIFIED HYPERLIPIDEMIA TYPE: ICD-10-CM

## 2022-05-04 DIAGNOSIS — I25.10 ATHEROSCLEROSIS OF CORONARY ARTERY OF NATIVE HEART WITHOUT ANGINA PECTORIS, UNSPECIFIED VESSEL OR LESION TYPE: Primary | Chronic | ICD-10-CM

## 2022-05-04 DIAGNOSIS — I10 HYPERTENSION, ESSENTIAL: ICD-10-CM

## 2022-05-04 PROCEDURE — 99214 OFFICE O/P EST MOD 30 MIN: CPT | Performed by: INTERNAL MEDICINE

## 2022-05-04 NOTE — ASSESSMENT & PLAN NOTE
Her lipids are not at goal although improved compared to 6 months ago by taking the Zetia-every day instead of every other day.  She does not want Repatha or Praluent and she is very intolerant to Lipitor at [facility].  She refuses to try nexlizet and therefore I am suggesting to her to increase her Livalo to 2 mg a day and see how she tolerates it.  Her ideal LDL is less than 70.  She understands and is willing to try.  We will have her prescriber 4 mg of Livalo half a tablet a day since it is very expensive for her.  She will continue the Zetia 10 mg once a day

## 2022-05-04 NOTE — PROGRESS NOTES
Office Visit    Chief Complaint  Hypertension and Hyperlipidemia    Subjective            Shreya Marino presents to [facility]  Ms. Marino is a 76 years old female with coronary disease previous stent PCI hypertension hyperlipidemia who is doing very well.  She states that her home blood pressures 110s to 120s systolic and she denies any chest pain palpitation shortness of breath dizziness syncope.  She is extremely active and works very hard in her yard since her  cannot do much.  She does not exercise on a regular basis.      Past Medical History:   Diagnosis Date   • Atherosclerosis of coronary artery 7/6/2016    with previous stent/PCI (June 2016);    • Biceps tendinitis of right upper extremity 04/25/2018   • CAD (coronary artery disease) 07/06/2016   • Chest pain    • Heart attack (HCC)    • Heart burn    • HLD (hyperlipidemia) 01/28/2021   • Nasal fracture    • Right rotator cuff tear 04/25/2018   • Right shoulder pain    • SLAP tear of shoulder 04/25/2018   • Subacromial impingement of right shoulder 04/18/2018   • Vitamin D deficiency 01/28/2021       No Known Allergies     Past Surgical History:   Procedure Laterality Date   • BREAST BIOPSY Left    • CATARACT EXTRACTION     • CORONARY ANGIOPLASTY WITH STENT PLACEMENT     • HYSTERECTOMY     • NASAL SEPTUM SURGERY  07/10/2018    CLOSED REDUCTION, FRACTURE        Social History     Tobacco Use   • Smoking status: Never Smoker   • Smokeless tobacco: Never Used   Vaping Use   • Vaping Use: Never used   Substance Use Topics   • Alcohol use: Never   • Drug use: Never       Family History   Problem Relation Age of Onset   • Breast cancer Other    • Heart disease Other    • Cancer Other         Prior to Admission medications    Medication Sig Start Date End Date Taking? Authorizing Provider   aspirin 81 MG EC tablet aspirin 81 mg oral tablet,delayed release (DR/EC) take 1 tablet (81 mg) by oral route once daily   Active   Yes  "Maria Dolores Gómez MD   BIOTIN PO Take  by mouth.   Yes Maria Dolores Gómez MD   Calcium Carb-Cholecalciferol (Caltrate 600+D3 Soft) 600-800 MG-UNIT chewable tablet Caltrate 600 + D 600 mg (1,500 mg)-800 unit oral tablet,chewable chew 1 tablet by oral route 2 times a day   Active   Yes Maria Dolores Gómez MD   Cholecalciferol 25 MCG (1000 UT) capsule Vitamin D3 1,000 unit oral capsule take 1 capsule by oral route daily   Active   Yes Maria Dolores Gómez MD   ezetimibe (ZETIA) 10 MG tablet Take 1 tablet by mouth Daily. 10/21/21  Yes Cora Albarran APRN   hydroCHLOROthiazide (HYDRODIURIL) 12.5 MG tablet Take 1 tablet by mouth Daily. 10/21/21  Yes Cora Albarran APRN   oxybutynin XL (DITROPAN-XL) 5 MG 24 hr tablet Take 1 tablet by mouth Daily. 3/22/22  Yes Damon Mosley PA   pitavastatin calcium (Livalo) 1 MG tablet tablet Take 1 tablet by mouth Daily. 10/21/21  Yes Cora Albarran, KOLE        Review of Systems   Constitutional: Negative for fatigue.   Respiratory: Negative for cough and shortness of breath.    Cardiovascular: Negative for chest pain, palpitations and leg swelling.   Neurological: Negative for dizziness.        Objective     /72   Pulse 79   Ht 166.4 cm (65.5\")   Wt 81.2 kg (179 lb)   BMI 29.33 kg/m²       Physical Exam  Constitutional:       General: She is awake.      Appearance: Normal appearance.   Neck:      Thyroid: No thyromegaly.      Vascular: No carotid bruit or JVD.   Cardiovascular:      Rate and Rhythm: Normal rate and regular rhythm.      Chest Wall: PMI is not displaced.      Pulses: Normal pulses.      Heart sounds: Normal heart sounds, S1 normal and S2 normal. No murmur heard.    No friction rub. No gallop. No S3 or S4 sounds.   Pulmonary:      Effort: Pulmonary effort is normal.      Breath sounds: Normal breath sounds and air entry. No wheezing, rhonchi or rales.   Abdominal:      General: Bowel sounds are normal.      Palpations: Abdomen is soft. There " is no mass.      Tenderness: There is no abdominal tenderness.   Musculoskeletal:      Cervical back: Neck supple.      Right lower leg: No edema.      Left lower leg: No edema.   Neurological:      Mental Status: She is alert and oriented to person, place, and time.   Psychiatric:         Mood and Affect: Mood normal.         Behavior: Behavior is cooperative.           Result Review :                           Assessment and Plan        Diagnoses and all orders for this visit:    1. Atherosclerosis of coronary artery of native heart without angina pectoris, unspecified vessel or lesion type (Primary)  Assessment & Plan:  She had stent PCI of her very calcified LAD in 2016.  She has not had any angina since then.    Orders:  -     Lipid Panel; Future  -     Comprehensive Metabolic Panel; Future  -     Magnesium; Future  -     Lipid Panel; Future  -     Comprehensive Metabolic Panel; Future  -     Magnesium; Future    2. Hyperlipidemia, unspecified hyperlipidemia type  Assessment & Plan:  Her lipids are not at goal although improved compared to 6 months ago by taking the Zetia-every day instead of every other day.  She does not want Repatha or Praluent and she is very intolerant to Lipitor at [facility].  She refuses to try nexlizet and therefore I am suggesting to her to increase her Livalo to 2 mg a day and see how she tolerates it.  Her ideal LDL is less than 70.  She understands and is willing to try.  We will have her prescriber 4 mg of Livalo half a tablet a day since it is very expensive for her.  She will continue the Zetia 10 mg once a day    Orders:  -     Pitavastatin Calcium (Livalo) 4 MG tablet; Take 0.5 tablets by mouth Daily.  Dispense: 90 tablet; Refill: 3  -     Lipid Panel; Future  -     Comprehensive Metabolic Panel; Future  -     Magnesium; Future  -     Lipid Panel; Future  -     Comprehensive Metabolic Panel; Future  -     Magnesium; Future    3. Hypertension, essential  Assessment &  Plan:  According to her her home blood pressure is 110-120 systolic and she does not want any other antihypertensive except the hydrochlorothiazide.  I have asked her to document 2 hours a 2-week blood pressure log    Orders:  -     Lipid Panel; Future  -     Comprehensive Metabolic Panel; Future  -     Magnesium; Future  -     Lipid Panel; Future  -     Comprehensive Metabolic Panel; Future  -     Magnesium; Future          Follow Up     Return in about 7 months (around 12/4/2022) for Dr Cueva.    Patient was given instructions and counseling regarding her condition or for health maintenance advice. Please see specific information pulled into the AVS if appropriate.     Zulema Byrd MD  05/04/22 14:38 EDT

## 2022-05-04 NOTE — ASSESSMENT & PLAN NOTE
According to her her home blood pressure is 110-120 systolic and she does not want any other antihypertensive except the hydrochlorothiazide.  I have asked her to document 2 hours a 2-week blood pressure log

## 2022-08-24 ENCOUNTER — LAB (OUTPATIENT)
Dept: LAB | Facility: HOSPITAL | Age: 77
End: 2022-08-24

## 2022-08-24 DIAGNOSIS — E78.5 HYPERLIPIDEMIA, UNSPECIFIED HYPERLIPIDEMIA TYPE: ICD-10-CM

## 2022-08-24 DIAGNOSIS — I10 HYPERTENSION, ESSENTIAL: ICD-10-CM

## 2022-08-24 DIAGNOSIS — I25.10 ATHEROSCLEROSIS OF CORONARY ARTERY OF NATIVE HEART WITHOUT ANGINA PECTORIS, UNSPECIFIED VESSEL OR LESION TYPE: Chronic | ICD-10-CM

## 2022-08-24 LAB
ALBUMIN SERPL-MCNC: 4.3 G/DL (ref 3.5–5.2)
ALBUMIN/GLOB SERPL: 2.3 G/DL
ALP SERPL-CCNC: 63 U/L (ref 39–117)
ALT SERPL W P-5'-P-CCNC: 25 U/L (ref 1–33)
ANION GAP SERPL CALCULATED.3IONS-SCNC: 11 MMOL/L (ref 5–15)
AST SERPL-CCNC: 22 U/L (ref 1–32)
BILIRUB SERPL-MCNC: 0.5 MG/DL (ref 0–1.2)
BUN SERPL-MCNC: 13 MG/DL (ref 8–23)
BUN/CREAT SERPL: 13.8 (ref 7–25)
CALCIUM SPEC-SCNC: 9.6 MG/DL (ref 8.6–10.5)
CHLORIDE SERPL-SCNC: 104 MMOL/L (ref 98–107)
CHOLEST SERPL-MCNC: 170 MG/DL (ref 0–200)
CO2 SERPL-SCNC: 25 MMOL/L (ref 22–29)
CREAT SERPL-MCNC: 0.94 MG/DL (ref 0.57–1)
EGFRCR SERPLBLD CKD-EPI 2021: 61.9 ML/MIN/1.73
GLOBULIN UR ELPH-MCNC: 1.9 GM/DL
GLUCOSE SERPL-MCNC: 87 MG/DL (ref 65–99)
HDLC SERPL-MCNC: 46 MG/DL (ref 40–60)
LDLC SERPL CALC-MCNC: 96 MG/DL (ref 0–100)
LDLC/HDLC SERPL: 2 {RATIO}
MAGNESIUM SERPL-MCNC: 2.1 MG/DL (ref 1.6–2.4)
POTASSIUM SERPL-SCNC: 4.4 MMOL/L (ref 3.5–5.2)
PROT SERPL-MCNC: 6.2 G/DL (ref 6–8.5)
SODIUM SERPL-SCNC: 140 MMOL/L (ref 136–145)
TRIGL SERPL-MCNC: 160 MG/DL (ref 0–150)
VLDLC SERPL-MCNC: 28 MG/DL (ref 5–40)

## 2022-08-24 PROCEDURE — 80061 LIPID PANEL: CPT

## 2022-08-24 PROCEDURE — 80053 COMPREHEN METABOLIC PANEL: CPT

## 2022-08-24 PROCEDURE — 83735 ASSAY OF MAGNESIUM: CPT

## 2022-08-24 PROCEDURE — 36415 COLL VENOUS BLD VENIPUNCTURE: CPT

## 2022-08-25 ENCOUNTER — TELEPHONE (OUTPATIENT)
Dept: CARDIOLOGY | Facility: CLINIC | Age: 77
End: 2022-08-25

## 2022-08-25 NOTE — TELEPHONE ENCOUNTER
----- Message from Zulema Byrd MD sent at 8/25/2022 10:40 AM EDT -----  Ask her how much Livalo and Zetia is she taking??

## 2022-08-29 ENCOUNTER — OFFICE VISIT (OUTPATIENT)
Dept: FAMILY MEDICINE CLINIC | Facility: CLINIC | Age: 77
End: 2022-08-29

## 2022-08-29 ENCOUNTER — HOSPITAL ENCOUNTER (OUTPATIENT)
Dept: GENERAL RADIOLOGY | Facility: HOSPITAL | Age: 77
Discharge: HOME | End: 2022-08-29
Admitting: PHYSICIAN ASSISTANT

## 2022-08-29 VITALS
OXYGEN SATURATION: 98 % | DIASTOLIC BLOOD PRESSURE: 80 MMHG | SYSTOLIC BLOOD PRESSURE: 117 MMHG | WEIGHT: 178 LBS | HEIGHT: 66 IN | BODY MASS INDEX: 28.61 KG/M2 | HEART RATE: 84 BPM

## 2022-08-29 DIAGNOSIS — I25.10 ATHEROSCLEROSIS OF CORONARY ARTERY OF NATIVE HEART WITHOUT ANGINA PECTORIS, UNSPECIFIED VESSEL OR LESION TYPE: ICD-10-CM

## 2022-08-29 DIAGNOSIS — Z11.59 ENCOUNTER FOR HEPATITIS C SCREENING TEST FOR LOW RISK PATIENT: ICD-10-CM

## 2022-08-29 DIAGNOSIS — R06.02 SHORTNESS OF BREATH: ICD-10-CM

## 2022-08-29 DIAGNOSIS — M72.0 DUPUYTREN CONTRACTURE: Primary | ICD-10-CM

## 2022-08-29 DIAGNOSIS — R60.0 LOCALIZED EDEMA: ICD-10-CM

## 2022-08-29 DIAGNOSIS — L98.9 SKIN LESION OF NECK: ICD-10-CM

## 2022-08-29 PROCEDURE — 71046 X-RAY EXAM CHEST 2 VIEWS: CPT

## 2022-08-29 PROCEDURE — 99213 OFFICE O/P EST LOW 20 MIN: CPT | Performed by: PHYSICIAN ASSISTANT

## 2022-08-29 NOTE — PROGRESS NOTES
Chief Complaint  Skin Lesion (Left side neck)    SUBJECTIVE  Shreya Marino presents to [facility]    History of Present Illness     Pt presents today for skin lesion on left side neck.    Pt states she noticed a spot on her left side neck couple of weeks ago.  Pt has a small raised, crusty area that pt states just appeared.  Pt states area is tender to touch.    Pt states she has concerns about CHF. Pt states her ankles stay swollen. Pt states she has previously mentioned to . pt states Dr. Nichols only worries about her cholesterol.    Pt states her throat feels like sand paper. Pt states she has been having this issues for a while.    Pt states she would like to discuss her issues with trigger finger. Pt states she has issues on her rt hand.     Labs 8/24/22  LDL 96    SOB when lying down and swelling    Past Medical History:   Diagnosis Date   • Atherosclerosis of coronary artery 7/6/2016    with previous stent/PCI (June 2016);    • Biceps tendinitis of right upper extremity 04/25/2018   • CAD (coronary artery disease) 07/06/2016   • Chest pain    • Heart attack (HCC)    • Heart burn    • HLD (hyperlipidemia) 01/28/2021   • Nasal fracture    • Right rotator cuff tear 04/25/2018   • Right shoulder pain    • SLAP tear of shoulder 04/25/2018   • Subacromial impingement of right shoulder 04/18/2018   • Vitamin D deficiency 01/28/2021      Family History   Problem Relation Age of Onset   • Breast cancer Other    • Heart disease Other    • Cancer Other       Past Surgical History:   Procedure Laterality Date   • BREAST BIOPSY Left    • CATARACT EXTRACTION     • CORONARY ANGIOPLASTY WITH STENT PLACEMENT     • HYSTERECTOMY     • NASAL SEPTUM SURGERY  07/10/2018    CLOSED REDUCTION, FRACTURE        Current Outpatient Medications:   •  aspirin 81 MG EC tablet, aspirin 81 mg oral tablet,delayed release (DR/EC) take 1 tablet (81 mg) by oral route once daily   Active, Disp: , Rfl:  "  •  BIOTIN PO, Take  by mouth., Disp: , Rfl:   •  Cholecalciferol 25 MCG (1000 UT) capsule, Vitamin D3 1,000 unit oral capsule take 1 capsule by oral route daily   Active, Disp: , Rfl:   •  ezetimibe (ZETIA) 10 MG tablet, Take 1 tablet by mouth Daily., Disp: 90 tablet, Rfl: 3  •  hydroCHLOROthiazide (HYDRODIURIL) 12.5 MG tablet, Take 1 tablet by mouth Daily., Disp: 90 tablet, Rfl: 3  •  oxybutynin XL (DITROPAN-XL) 5 MG 24 hr tablet, Take 1 tablet by mouth Daily., Disp: 90 tablet, Rfl: 1  •  Pitavastatin Calcium (Livalo) 4 MG tablet, Take 0.5 tablets by mouth Daily., Disp: 90 tablet, Rfl: 3  •  Calcium Carb-Cholecalciferol (Caltrate 600+D3 Soft) 600-800 MG-UNIT chewable tablet, Caltrate 600 + D 600 mg (1,500 mg)-800 unit oral tablet,chewable chew 1 tablet by oral route 2 times a day   Active, Disp: , Rfl:     OBJECTIVE  Vital Signs:   /80 (BP Location: Left arm)   Pulse 84   Ht 166.4 cm (65.51\")   Wt 80.7 kg (178 lb)   SpO2 98%   BMI 29.16 kg/m²    Estimated body mass index is 29.16 kg/m² as calculated from the following:    Height as of this encounter: 166.4 cm (65.51\").    Weight as of this encounter: 80.7 kg (178 lb).     Wt Readings from Last 3 Encounters:   08/29/22 80.7 kg (178 lb)   05/04/22 81.2 kg (179 lb)   03/22/22 79.4 kg (175 lb)     BP Readings from Last 3 Encounters:   08/29/22 117/80   05/04/22 139/72   03/22/22 125/71     Physical Exam  Vitals and nursing note reviewed.   Constitutional:       Appearance: Normal appearance.   HENT:      Head: Normocephalic and atraumatic.   Cardiovascular:      Rate and Rhythm: Normal rate and regular rhythm.      Heart sounds: Normal heart sounds.   Pulmonary:      Effort: Pulmonary effort is normal.      Breath sounds: Normal breath sounds.   Musculoskeletal:         General: Swelling present.      Cervical back: Neck supple.      Right lower leg: Edema present.      Left lower leg: Edema present.   Skin:     Comments: Left Neck - erythematous scaly " lesion    Neurological:      Mental Status: She is alert.   Psychiatric:         Mood and Affect: Mood normal.         Behavior: Behavior normal.        Result Review    Common labs    Common Labsle 10/14/21 10/14/21 4/28/22 4/28/22 8/24/22 8/24/22    1023 1023 0911 0911 1130 1130   Glucose 93   95  87   BUN 12   15  13   Creatinine 0.68   0.80  0.94   eGFR Non African Am 84        Sodium 142   141  140   Potassium 4.1   4.1  4.4   Chloride 104   103  104   Calcium 10.2   10.2  9.6   Albumin 4.40   4.20  4.30   Total Bilirubin 0.4   0.5  0.5   Alkaline Phosphatase 64   69  63   AST (SGOT) 19   26  22   ALT (SGPT) 27   35 (A)  25   Total Cholesterol  194 171  170    Triglycerides  202 (A) 196 (A)  160 (A)    HDL Cholesterol  44 44  46    LDL Cholesterol   115 (A) 93  96    (A) Abnormal value            No Images in the past 120 days found..      The above data has been reviewed by CHRISS Haywood 08/29/2022 14:27 EDT.          Patient Care Team:  Damon Mosley PA as PCP - General (Physician Assistant)    ASSESSMENT & PLAN    Diagnoses and all orders for this visit:    1. Dupuytren contracture (Primary)  -     Ambulatory Referral to Orthopedic Surgery    2. Skin lesion of neck  -     Ambulatory Referral to Dermatology    3. Shortness of breath  -     BNP; Future  -     XR Chest PA & Lateral; Future  -     Adult Transthoracic Echo Complete W/ Cont if Necessary Per Protocol; Future    4. Localized edema  -     BNP; Future  -     XR Chest PA & Lateral; Future  -     Adult Transthoracic Echo Complete W/ Cont if Necessary Per Protocol; Future    5. Atherosclerosis of coronary artery of native heart without angina pectoris, unspecified vessel or lesion type  Overview:  with previous stent/PCI (June 2016);     Orders:  -     BNP; Future  -     XR Chest PA & Lateral; Future  -     Adult Transthoracic Echo Complete W/ Cont if Necessary Per Protocol; Future    6. Encounter for hepatitis C screening test for low risk patient  -      Hepatitis C antibody; Future     Tobacco Use: Low Risk    • Smoking Tobacco Use: Never Smoker   • Smokeless Tobacco Use: Never Used     Follow Up     Return in about 3 months (around 11/29/2022).    Patient was given instructions and counseling regarding her condition or for health maintenance advice. Please see specific information pulled into the AVS if appropriate.   I have reviewed information obtained and documented by others and I have confirmed the accuracy of this documented note.    CHRISS Haywood

## 2022-08-30 NOTE — TELEPHONE ENCOUNTER
I recommend that she be on Repatha since her LDL is significantly above goal.    Electronically signed by Zulema Byrd MD, 08/30/22, 5:59 PM EDT.

## 2022-10-03 ENCOUNTER — HOSPITAL ENCOUNTER (OUTPATIENT)
Dept: CARDIOLOGY | Facility: HOSPITAL | Age: 77
Discharge: HOME | End: 2022-10-03
Admitting: PHYSICIAN ASSISTANT

## 2022-10-03 DIAGNOSIS — R60.0 LOCALIZED EDEMA: ICD-10-CM

## 2022-10-03 DIAGNOSIS — I25.10 ATHEROSCLEROSIS OF CORONARY ARTERY OF NATIVE HEART WITHOUT ANGINA PECTORIS, UNSPECIFIED VESSEL OR LESION TYPE: ICD-10-CM

## 2022-10-03 DIAGNOSIS — R06.02 SHORTNESS OF BREATH: ICD-10-CM

## 2022-10-03 PROCEDURE — 93306 TTE W/DOPPLER COMPLETE: CPT | Performed by: INTERNAL MEDICINE

## 2022-10-03 PROCEDURE — 93306 TTE W/DOPPLER COMPLETE: CPT

## 2022-10-09 LAB
BH CV ECHO MEAS - AI P1/2T: 613 MSEC
BH CV ECHO MEAS - AO ROOT DIAM: 3.1 CM
BH CV ECHO MEAS - EF(MOD-BP): 65 %
BH CV ECHO MEAS - IVSD: 1.5 CM
BH CV ECHO MEAS - LA DIMENSION: 3.1 CM
BH CV ECHO MEAS - LAT PEAK E' VEL: 6.2 CM/SEC
BH CV ECHO MEAS - LVIDD: 2.9 CM
BH CV ECHO MEAS - LVIDS: 1.9 CM
BH CV ECHO MEAS - LVOT DIAM: 2 CM
BH CV ECHO MEAS - LVPWD: 1.5 CM
BH CV ECHO MEAS - MED PEAK E' VEL: 8.39 CM/SEC
BH CV ECHO MEAS - MV A MAX VEL: 151 CM/SEC
BH CV ECHO MEAS - MV DEC TIME: 156 MSEC
BH CV ECHO MEAS - MV E MAX VEL: 76 CM/SEC
BH CV ECHO MEAS - MV E/A: 0.5
BH CV ECHO MEASUREMENTS AVERAGE E/E' RATIO: 10.42
IVRT: 92 MSEC
LEFT ATRIUM VOLUME INDEX: 13.9 ML/M2
MAXIMAL PREDICTED HEART RATE: 141 BPM
STRESS TARGET HR: 120 BPM

## 2022-10-11 ENCOUNTER — TELEPHONE (OUTPATIENT)
Dept: CARDIOLOGY | Facility: CLINIC | Age: 77
End: 2022-10-11

## 2022-10-11 NOTE — TELEPHONE ENCOUNTER
Procedure: (R) Dupuytrens fasciectomy with possible zplasty and PIPJ capsulotomy    Medication Directive: NA    PMH: CAD w/PCI 2016- Hyperlipidemia, HTN,     Last Seen: 05/04/2022    ECHO: 10/3/22     Normal left ventricular systolic function with an estimated ejection fraction of 65-70%.  No regional wall motion abnormalities were observed.  Left ventricular diastolic function is consistent with impaired relaxation (grade I diastolic dysfunction).  Moderate concentric left ventricular hypertrophy.  Trace aortic regurgitation, but no hemodynamically significant valvular pathology.  Right ventricular systolic pressure could not be accurately estimated due to an insufficient TR velocity profile.

## 2022-10-31 ENCOUNTER — LAB (OUTPATIENT)
Dept: LAB | Facility: HOSPITAL | Age: 77
End: 2022-10-31

## 2022-10-31 DIAGNOSIS — I25.10 ATHEROSCLEROSIS OF CORONARY ARTERY OF NATIVE HEART WITHOUT ANGINA PECTORIS, UNSPECIFIED VESSEL OR LESION TYPE: Chronic | ICD-10-CM

## 2022-10-31 DIAGNOSIS — E78.5 HYPERLIPIDEMIA, UNSPECIFIED HYPERLIPIDEMIA TYPE: ICD-10-CM

## 2022-10-31 DIAGNOSIS — I10 HYPERTENSION, ESSENTIAL: ICD-10-CM

## 2022-10-31 LAB
ALBUMIN SERPL-MCNC: 4.4 G/DL (ref 3.5–5.2)
ALBUMIN/GLOB SERPL: 2 G/DL
ALP SERPL-CCNC: 70 U/L (ref 39–117)
ALT SERPL W P-5'-P-CCNC: 24 U/L (ref 1–33)
ANION GAP SERPL CALCULATED.3IONS-SCNC: 8.6 MMOL/L (ref 5–15)
AST SERPL-CCNC: 25 U/L (ref 1–32)
BILIRUB SERPL-MCNC: 0.4 MG/DL (ref 0–1.2)
BUN SERPL-MCNC: 11 MG/DL (ref 8–23)
BUN/CREAT SERPL: 13.8 (ref 7–25)
CALCIUM SPEC-SCNC: 10.3 MG/DL (ref 8.6–10.5)
CHLORIDE SERPL-SCNC: 105 MMOL/L (ref 98–107)
CHOLEST SERPL-MCNC: 155 MG/DL (ref 0–200)
CO2 SERPL-SCNC: 27.4 MMOL/L (ref 22–29)
CREAT SERPL-MCNC: 0.8 MG/DL (ref 0.57–1)
EGFRCR SERPLBLD CKD-EPI 2021: 75.1 ML/MIN/1.73
GLOBULIN UR ELPH-MCNC: 2.2 GM/DL
GLUCOSE SERPL-MCNC: 100 MG/DL (ref 65–99)
HDLC SERPL-MCNC: 44 MG/DL (ref 40–60)
LDLC SERPL CALC-MCNC: 83 MG/DL (ref 0–100)
LDLC/HDLC SERPL: 1.78 {RATIO}
MAGNESIUM SERPL-MCNC: 2.1 MG/DL (ref 1.6–2.4)
POTASSIUM SERPL-SCNC: 4.4 MMOL/L (ref 3.5–5.2)
PROT SERPL-MCNC: 6.6 G/DL (ref 6–8.5)
SODIUM SERPL-SCNC: 141 MMOL/L (ref 136–145)
TRIGL SERPL-MCNC: 164 MG/DL (ref 0–150)
VLDLC SERPL-MCNC: 28 MG/DL (ref 5–40)

## 2022-10-31 PROCEDURE — 83735 ASSAY OF MAGNESIUM: CPT

## 2022-10-31 PROCEDURE — 80061 LIPID PANEL: CPT

## 2022-10-31 PROCEDURE — 80053 COMPREHEN METABOLIC PANEL: CPT

## 2022-10-31 PROCEDURE — 36415 COLL VENOUS BLD VENIPUNCTURE: CPT

## 2022-11-04 ENCOUNTER — TELEPHONE (OUTPATIENT)
Dept: CARDIOLOGY | Facility: CLINIC | Age: 77
End: 2022-11-04

## 2022-11-04 NOTE — TELEPHONE ENCOUNTER
Received VM regarding cholesterol levels.  SW patient in regards to Lipid panel. Patient wanted clarification, went over levels one by one. Patient states she is still unable to tolerate Livalo whole pill and is still taking half pill as prescribed. Patient expressed understanding and appreciation.

## 2022-11-17 DIAGNOSIS — E78.5 HYPERLIPIDEMIA, UNSPECIFIED HYPERLIPIDEMIA TYPE: ICD-10-CM

## 2022-11-17 RX ORDER — EZETIMIBE 10 MG/1
10 TABLET ORAL DAILY
Qty: 90 TABLET | Refills: 0 | Status: SHIPPED | OUTPATIENT
Start: 2022-11-17 | End: 2023-03-07 | Stop reason: SDUPTHER

## 2022-11-29 ENCOUNTER — OFFICE VISIT (OUTPATIENT)
Dept: FAMILY MEDICINE CLINIC | Facility: CLINIC | Age: 77
End: 2022-11-29

## 2022-11-29 VITALS
WEIGHT: 171 LBS | BODY MASS INDEX: 27.48 KG/M2 | HEIGHT: 66 IN | SYSTOLIC BLOOD PRESSURE: 106 MMHG | OXYGEN SATURATION: 97 % | HEART RATE: 90 BPM | DIASTOLIC BLOOD PRESSURE: 61 MMHG

## 2022-11-29 DIAGNOSIS — E78.5 HYPERLIPIDEMIA, UNSPECIFIED HYPERLIPIDEMIA TYPE: Primary | ICD-10-CM

## 2022-11-29 DIAGNOSIS — I10 HYPERTENSION, ESSENTIAL: ICD-10-CM

## 2022-11-29 DIAGNOSIS — R32 URINARY INCONTINENCE, UNSPECIFIED TYPE: ICD-10-CM

## 2022-11-29 PROCEDURE — 99214 OFFICE O/P EST MOD 30 MIN: CPT | Performed by: NURSE PRACTITIONER

## 2022-11-29 RX ORDER — OXYBUTYNIN CHLORIDE 5 MG/1
5 TABLET, EXTENDED RELEASE ORAL DAILY
Qty: 90 TABLET | Refills: 1 | Status: SHIPPED | OUTPATIENT
Start: 2022-11-29 | End: 2022-11-29 | Stop reason: DRUGHIGH

## 2022-11-29 RX ORDER — CETIRIZINE HYDROCHLORIDE 10 MG/1
10 TABLET ORAL DAILY
COMMUNITY

## 2022-11-29 RX ORDER — OXYBUTYNIN CHLORIDE 10 MG/1
10 TABLET, EXTENDED RELEASE ORAL DAILY
Qty: 90 TABLET | Refills: 1 | Status: SHIPPED | OUTPATIENT
Start: 2022-11-29 | End: 2022-12-08

## 2022-11-29 RX ORDER — LANOLIN ALCOHOL/MO/W.PET/CERES
1000 CREAM (GRAM) TOPICAL DAILY
COMMUNITY

## 2022-11-29 NOTE — PROGRESS NOTES
Chief Complaint  HTN, HLD, urinary incontinence    Subjective            Shreya Marino presents to [facility]  History of Present Illness  Pt is here to establish care from Damon Mosley. Pt would like to discuss urinary issues. Pt is currently on Oxybutynin and states that is not helping much. Pt would like to know if there is something else she can try.     Pt is having a hard time sleeping due to frequent urinartion.    Pt is followed by Dr. Cueva for Cardiology.         Past Medical History:   Diagnosis Date   • Atherosclerosis of coronary artery 7/6/2016    with previous stent/PCI (June 2016);    • Biceps tendinitis of right upper extremity 04/25/2018   • CAD (coronary artery disease) 07/06/2016   • Chest pain    • Heart attack (HCC)    • Heart burn    • HLD (hyperlipidemia) 01/28/2021   • Nasal fracture    • Right rotator cuff tear 04/25/2018   • Right shoulder pain    • SLAP tear of shoulder 04/25/2018   • Subacromial impingement of right shoulder 04/18/2018   • Vitamin D deficiency 01/28/2021       No Known Allergies     Past Surgical History:   Procedure Laterality Date   • BREAST BIOPSY Left    • CATARACT EXTRACTION     • CORONARY ANGIOPLASTY WITH STENT PLACEMENT     • HYSTERECTOMY     • NASAL SEPTUM SURGERY  07/10/2018    CLOSED REDUCTION, FRACTURE        Social History     Tobacco Use   • Smoking status: Never   • Smokeless tobacco: Never   Substance Use Topics   • Alcohol use: Never       Family History   Problem Relation Age of Onset   • Breast cancer Other    • Heart disease Other    • Cancer Other         Current Outpatient Medications on File Prior to Visit   Medication Sig   • aspirin 81 MG EC tablet aspirin 81 mg oral tablet,delayed release (DR/EC) take 1 tablet (81 mg) by oral route once daily   Active   • BIOTIN PO Take  by mouth.   • Calcium Carb-Cholecalciferol (Caltrate 600+D3 Soft) 600-800 MG-UNIT chewable tablet Caltrate 600 + D 600 mg (1,500 mg)-800 unit  "oral tablet,chewable chew 1 tablet by oral route 2 times a day   Active   • cetirizine (zyrTEC) 10 MG tablet Take 10 mg by mouth Daily.   • Cholecalciferol 25 MCG (1000 UT) capsule Vitamin D3 1,000 unit oral capsule take 1 capsule by oral route daily   Active   • ezetimibe (ZETIA) 10 MG tablet Take 1 tablet by mouth Daily.   • hydroCHLOROthiazide (HYDRODIURIL) 12.5 MG tablet Take 1 tablet by mouth Daily.   • Pitavastatin Calcium (Livalo) 4 MG tablet Take 0.5 tablets by mouth Daily.   • vitamin B-12 (CYANOCOBALAMIN) 1000 MCG tablet Take 1,000 mcg by mouth Daily.   • [DISCONTINUED] oxybutynin XL (DITROPAN-XL) 5 MG 24 hr tablet Take 1 tablet by mouth Daily.     No current facility-administered medications on file prior to visit.       Health Maintenance Due   Topic Date Due   • HEPATITIS C SCREENING  Never done       Objective     /61   Pulse 90   Ht 166.4 cm (65.5\")   Wt 77.6 kg (171 lb)   SpO2 97%   BMI 28.02 kg/m²       Physical Exam  Constitutional:       General: She is not in acute distress.     Appearance: Normal appearance. She is not ill-appearing.   HENT:      Head: Normocephalic and atraumatic.   Cardiovascular:      Rate and Rhythm: Normal rate and regular rhythm.      Heart sounds: Normal heart sounds. No murmur heard.  Pulmonary:      Effort: Pulmonary effort is normal. No respiratory distress.      Breath sounds: Normal breath sounds.   Chest:      Chest wall: No tenderness.   Abdominal:      General: Abdomen is flat. Bowel sounds are normal. There is no distension.      Palpations: Abdomen is soft. There is no mass.      Tenderness: There is no abdominal tenderness. There is no guarding.   Musculoskeletal:         General: No swelling or tenderness. Normal range of motion.      Cervical back: Normal range of motion and neck supple.   Skin:     General: Skin is warm and dry.      Findings: No rash.   Neurological:      General: No focal deficit present.      Mental Status: She is alert and " oriented to person, place, and time. Mental status is at baseline.      Gait: Gait normal.   Psychiatric:         Mood and Affect: Mood normal.         Behavior: Behavior normal.         Thought Content: Thought content normal.         Judgment: Judgment normal.           Result Review :                           Assessment and Plan        Diagnoses and all orders for this visit:    1. Hyperlipidemia, unspecified hyperlipidemia type (Primary)  Comments:  stable on zetia 10mg and livalo 4mg, continue    2. Urinary incontinence, unspecified type  Comments:  will increase ditropan to 10mg and see if symptoms are improved  Orders:  -     Discontinue: oxybutynin XL (DITROPAN-XL) 5 MG 24 hr tablet; Take 1 tablet by mouth Daily.  Dispense: 90 tablet; Refill: 1  -     oxybutynin XL (DITROPAN-XL) 10 MG 24 hr tablet; Take 1 tablet by mouth Daily.  Dispense: 90 tablet; Refill: 1    3. Hypertension, essential  Comments:  stable on hctz 12.5mg, continue              Follow Up     Return in about 6 months (around 5/29/2023).    Patient was given instructions and counseling regarding her condition or for health maintenance advice. Please see specific information pulled into the AVS if appropriate.     Shreya Sada Marino  reports that she has never smoked. She has never used smokeless tobacco..

## 2022-12-05 DIAGNOSIS — I10 HYPERTENSION, ESSENTIAL: ICD-10-CM

## 2022-12-05 RX ORDER — HYDROCHLOROTHIAZIDE 12.5 MG/1
12.5 TABLET ORAL DAILY
Qty: 90 TABLET | Refills: 3 | Status: SHIPPED | OUTPATIENT
Start: 2022-12-05

## 2022-12-07 ENCOUNTER — PATIENT ROUNDING (BHMG ONLY) (OUTPATIENT)
Dept: FAMILY MEDICINE CLINIC | Facility: CLINIC | Age: 77
End: 2022-12-07

## 2022-12-07 NOTE — PROGRESS NOTES
A [employer] message has been sent to the patient for PATIENT ROUNDING with [facility].

## 2022-12-08 DIAGNOSIS — R32 URINARY INCONTINENCE, UNSPECIFIED TYPE: Primary | ICD-10-CM

## 2022-12-08 RX ORDER — OXYBUTYNIN CHLORIDE 5 MG/1
5 TABLET, EXTENDED RELEASE ORAL DAILY
Qty: 90 TABLET | Refills: 1 | Status: SHIPPED | OUTPATIENT
Start: 2022-12-08

## 2022-12-08 NOTE — TELEPHONE ENCOUNTER
Pt call with c/o dryness and tongue swelling due to increase of oxybutynin from 5 to 10 mg. Per Manju finney to go back to the 5 mg.     Oxybutynin 5 mg sent to [facility].

## 2022-12-12 ENCOUNTER — OFFICE VISIT (OUTPATIENT)
Dept: CARDIOLOGY | Facility: CLINIC | Age: 77
End: 2022-12-12

## 2022-12-12 VITALS
SYSTOLIC BLOOD PRESSURE: 119 MMHG | BODY MASS INDEX: 28.57 KG/M2 | HEIGHT: 66 IN | WEIGHT: 177.8 LBS | DIASTOLIC BLOOD PRESSURE: 73 MMHG | HEART RATE: 91 BPM

## 2022-12-12 DIAGNOSIS — I25.10 CORONARY ARTERY DISEASE INVOLVING NATIVE CORONARY ARTERY OF NATIVE HEART WITHOUT ANGINA PECTORIS: Primary | ICD-10-CM

## 2022-12-12 DIAGNOSIS — I10 PRIMARY HYPERTENSION: ICD-10-CM

## 2022-12-12 DIAGNOSIS — E78.2 MIXED HYPERLIPIDEMIA: ICD-10-CM

## 2022-12-12 PROCEDURE — 99213 OFFICE O/P EST LOW 20 MIN: CPT | Performed by: INTERNAL MEDICINE

## 2022-12-12 NOTE — PROGRESS NOTES
Chief Complaint  Hyperlipidemia, Hypertension, and Coronary Artery Disease    Subjective        Shreya Marino presents to [facility]  History of present illness:    Patient states overall she is doing well.  She denies any exertional chest pain.  She does note some ankle swelling.  She does watch her salt and keep her feet elevated.  She was having some bladder problems and was put on oxybutynin.  They tried to increase the dose to 10 mg a day but she has significant dry mouth.  She does have a little bit of dry mouth at this time.  She states that the Livalo is very expensive.  She again declines cholesterol injections.      Past Medical History:   Diagnosis Date   • Atherosclerosis of coronary artery 7/6/2016    with previous stent/PCI (June 2016);    • Biceps tendinitis of right upper extremity 04/25/2018   • CAD (coronary artery disease) 07/06/2016   • Chest pain    • Heart attack (HCC)    • Heart burn    • HLD (hyperlipidemia) 01/28/2021   • Nasal fracture    • Right rotator cuff tear 04/25/2018   • Right shoulder pain    • SLAP tear of shoulder 04/25/2018   • Subacromial impingement of right shoulder 04/18/2018   • Vitamin D deficiency 01/28/2021         Past Surgical History:   Procedure Laterality Date   • BREAST BIOPSY Left    • CATARACT EXTRACTION     • CORONARY ANGIOPLASTY WITH STENT PLACEMENT     • HYSTERECTOMY     • NASAL SEPTUM SURGERY  07/10/2018    CLOSED REDUCTION, FRACTURE          Social History     Socioeconomic History   • Marital status:    Tobacco Use   • Smoking status: Never   • Smokeless tobacco: Never   Vaping Use   • Vaping Use: Never used   Substance and Sexual Activity   • Alcohol use: Never   • Drug use: Never         Family History   Problem Relation Age of Onset   • Breast cancer Other    • Heart disease Other    • Cancer Other           No Known Allergies         Current Outpatient Medications:   •  aspirin 81 MG EC tablet, aspirin 81 mg oral  "tablet,delayed release (DR/EC) take 1 tablet (81 mg) by oral route once daily   Active, Disp: , Rfl:   •  BIOTIN PO, Take  by mouth., Disp: , Rfl:   •  Calcium Carb-Cholecalciferol (Caltrate 600+D3 Soft) 600-800 MG-UNIT chewable tablet, Caltrate 600 + D 600 mg (1,500 mg)-800 unit oral tablet,chewable chew 1 tablet by oral route 2 times a day   Active, Disp: , Rfl:   •  cetirizine (zyrTEC) 10 MG tablet, Take 10 mg by mouth Daily., Disp: , Rfl:   •  Cholecalciferol 25 MCG (1000 UT) capsule, Vitamin D3 1,000 unit oral capsule take 1 capsule by oral route daily   Active, Disp: , Rfl:   •  ezetimibe (ZETIA) 10 MG tablet, Take 1 tablet by mouth Daily., Disp: 90 tablet, Rfl: 0  •  hydroCHLOROthiazide (HYDRODIURIL) 12.5 MG tablet, Take 1 tablet by mouth Daily., Disp: 90 tablet, Rfl: 3  •  oxybutynin XL (DITROPAN-XL) 5 MG 24 hr tablet, Take 1 tablet by mouth Daily., Disp: 90 tablet, Rfl: 1  •  Pitavastatin Calcium (Livalo) 4 MG tablet, Take 0.5 tablets by mouth Daily., Disp: 90 tablet, Rfl: 3  •  vitamin B-12 (CYANOCOBALAMIN) 1000 MCG tablet, Take 1,000 mcg by mouth Daily., Disp: , Rfl:       ROS:  Cardiac review of systems positive for mild edema    Objective     /73   Pulse 91   Ht 166.4 cm (65.5\")   Wt 80.6 kg (177 lb 12.8 oz)   BMI 29.14 kg/m²       General Appearance:   · well developed  · well nourished  HENT:   · oropharynx moist  · lips not cyanotic  Respiratory:  · no respiratory distress  · normal breath sounds  · no rales  Cardiovascular:  · no jugular venous distention  · regular rhythm  · S1 normal, S2 normal  · no S3, no S4   · no murmur  · no rub, no thrill  · No carotid bruit  · pedal pulses normal  · lower extremity edema: none    Musculoskeletal:  · no clubbing of fingers.   · normocephalic, head atraumatic  Skin:   · warm, dry  Psychiatric:  · judgement and insight appropriate  · normal mood and affect    ECHO:  Results for orders placed during the hospital encounter of 10/03/22    Adult " Transthoracic Echo Complete W/ Cont if Necessary Per Protocol    Interpretation Summary  Normal left ventricular systolic function with an estimated ejection fraction of 65-70%.  No regional wall motion abnormalities were observed.  Left ventricular diastolic function is consistent with impaired relaxation (grade I diastolic dysfunction).  Moderate concentric left ventricular hypertrophy.  Trace aortic regurgitation, but no hemodynamically significant valvular pathology.  Right ventricular systolic pressure could not be accurately estimated due to an insufficient TR velocity profile.    STRESS:    CATH:  No results found for this or any previous visit.    BMP:   Glucose   Date Value Ref Range Status   10/31/2022 100 (H) 65 - 99 mg/dL Final     BUN   Date Value Ref Range Status   10/31/2022 11 8 - 23 mg/dL Final     Creatinine   Date Value Ref Range Status   10/31/2022 0.80 0.57 - 1.00 mg/dL Final     Sodium   Date Value Ref Range Status   10/31/2022 141 136 - 145 mmol/L Final     Potassium   Date Value Ref Range Status   10/31/2022 4.4 3.5 - 5.2 mmol/L Final     Chloride   Date Value Ref Range Status   10/31/2022 105 98 - 107 mmol/L Final     CO2   Date Value Ref Range Status   10/31/2022 27.4 22.0 - 29.0 mmol/L Final     Calcium   Date Value Ref Range Status   10/31/2022 10.3 8.6 - 10.5 mg/dL Final     BUN/Creatinine Ratio   Date Value Ref Range Status   10/31/2022 13.8 7.0 - 25.0 Final     Anion Gap   Date Value Ref Range Status   10/31/2022 8.6 5.0 - 15.0 mmol/L Final     eGFR   Date Value Ref Range Status   10/31/2022 75.1 >60.0 mL/min/1.73 Final     Comment:     National Kidney Foundation and American Society of Nephrology (ASN) Task Force recommended calculation based on the Chronic Kidney Disease Epidemiology Collaboration (CKD-EPI) equation refit without adjustment for race.     LIPIDS:  Total Cholesterol   Date Value Ref Range Status   10/31/2022 155 0 - 200 mg/dL Final     Triglycerides   Date Value Ref  Range Status   10/31/2022 164 (H) 0 - 150 mg/dL Final     HDL Cholesterol   Date Value Ref Range Status   10/31/2022 44 40 - 60 mg/dL Final     LDL Cholesterol    Date Value Ref Range Status   10/31/2022 83 0 - 100 mg/dL Final     VLDL Cholesterol   Date Value Ref Range Status   10/31/2022 28 5 - 40 mg/dL Final     LDL/HDL Ratio   Date Value Ref Range Status   10/31/2022 1.78  Final         Procedures             ASSESSMENT:  Encounter Diagnoses   Name Primary?   • Coronary artery disease involving native coronary artery of native heart without angina pectoris Yes   • Primary hypertension    • Mixed hyperlipidemia          PLAN:    1.  Patient has very mild edema in bilateral lower extremities.  I do think this is likely due to mild venous insufficiency.  She is watching her salt and keeping her feet elevated.  I told her that if we considered a diuretic I think it would make her bladder problems much worse  2.  Continue the aspirin.  3.  Blood pressures under good control.  4.  Patient is taking the Livalo 2 mg a day along with the Zetia.  She had cholesterol checked 10/31/2022 with LDL 83, HDL 44, and triglycerides 164.  She did not tolerate any of the other statins and declines cholesterol injections.  I told her I would be happy with what she is doing but if she feels that the Livalo is too expensive she can stop it.          Patient was given instructions and counseling regarding her condition or for health maintenance advice. Please see specific information pulled into the AVS if appropriate.         Beni Cueva MD   12/12/2022  14:57 EST

## 2022-12-15 RX ORDER — NITROGLYCERIN 0.4 MG/1
TABLET SUBLINGUAL
Qty: 25 TABLET | Refills: 2 | Status: SHIPPED | OUTPATIENT
Start: 2022-12-15

## 2023-03-07 DIAGNOSIS — E78.5 HYPERLIPIDEMIA, UNSPECIFIED HYPERLIPIDEMIA TYPE: ICD-10-CM

## 2023-03-07 RX ORDER — EZETIMIBE 10 MG/1
10 TABLET ORAL DAILY
Qty: 90 TABLET | Refills: 3 | Status: SHIPPED | OUTPATIENT
Start: 2023-03-07

## 2023-03-15 ENCOUNTER — TELEPHONE (OUTPATIENT)
Dept: FAMILY MEDICINE CLINIC | Facility: CLINIC | Age: 78
End: 2023-03-15
Payer: MEDICARE

## 2023-03-15 DIAGNOSIS — Z78.0 MENOPAUSE: Primary | ICD-10-CM

## 2023-03-15 DIAGNOSIS — Z12.31 ENCOUNTER FOR SCREENING MAMMOGRAM FOR MALIGNANT NEOPLASM OF BREAST: ICD-10-CM

## 2023-05-24 ENCOUNTER — OFFICE VISIT (OUTPATIENT)
Dept: FAMILY MEDICINE CLINIC | Facility: CLINIC | Age: 78
End: 2023-05-24
Payer: MEDICARE

## 2023-05-24 VITALS
SYSTOLIC BLOOD PRESSURE: 131 MMHG | DIASTOLIC BLOOD PRESSURE: 65 MMHG | HEIGHT: 66 IN | HEART RATE: 90 BPM | BODY MASS INDEX: 28.93 KG/M2 | WEIGHT: 180 LBS | OXYGEN SATURATION: 99 %

## 2023-05-24 DIAGNOSIS — Z00.00 MEDICARE ANNUAL WELLNESS VISIT, SUBSEQUENT: Primary | ICD-10-CM

## 2023-05-24 DIAGNOSIS — Z23 NEED FOR PNEUMOCOCCAL VACCINATION: ICD-10-CM

## 2023-05-24 NOTE — PROGRESS NOTES
The ABCs of the Annual Wellness Visit  Subsequent Medicare Wellness Visit    Subjective      Shreya Marino is a 77 y.o. female who presents for a Subsequent Medicare Wellness Visit.    The following portions of the patient's history were reviewed and   updated as appropriate: allergies, current medications, past family history, past medical history, past social history, past surgical history and problem list.    Compared to one year ago, the patient feels her physical   health is the same.    Compared to one year ago, the patient feels her mental   health is the same.    Recent Hospitalizations:  She was not admitted to the hospital during the last year.       Current Medical Providers:  Patient Care Team:  Manju Rodney APRN as PCP - General (Nurse Practitioner)    Outpatient Medications Prior to Visit   Medication Sig Dispense Refill   • aspirin 81 MG EC tablet aspirin 81 mg oral tablet,delayed release (DR/EC) take 1 tablet (81 mg) by oral route once daily   Active     • BIOTIN PO Take  by mouth.     • Calcium Carb-Cholecalciferol (Caltrate 600+D3 Soft) 600-800 MG-UNIT chewable tablet Caltrate 600 + D 600 mg (1,500 mg)-800 unit oral tablet,chewable chew 1 tablet by oral route 2 times a day   Active     • cetirizine (zyrTEC) 10 MG tablet Take 10 mg by mouth Daily.     • Cholecalciferol 25 MCG (1000 UT) capsule Vitamin D3 1,000 unit oral capsule take 1 capsule by oral route daily   Active     • ezetimibe (ZETIA) 10 MG tablet Take 1 tablet by mouth Daily. 90 tablet 3   • hydroCHLOROthiazide (HYDRODIURIL) 12.5 MG tablet Take 1 tablet by mouth Daily. 90 tablet 3   • nitroglycerin (NITROSTAT) 0.4 MG SL tablet 1 under the tongue as needed for angina, may repeat q5mins for up three doses 25 tablet 2   • oxybutynin XL (DITROPAN-XL) 5 MG 24 hr tablet Take 1 tablet by mouth Daily. 90 tablet 1   • Pitavastatin Calcium (Livalo) 4 MG tablet Take 0.5 tablets by mouth Daily. 90 tablet 3   • vitamin B-12 (CYANOCOBALAMIN)  "1000 MCG tablet Take 1,000 mcg by mouth Daily.       No facility-administered medications prior to visit.       No opioid medication identified on active medication list. I have reviewed chart for other potential  high risk medication/s and harmful drug interactions in the elderly.          Aspirin is on active medication list. Aspirin use is indicated based on review of current medical condition/s. Pros and cons of this therapy have been discussed today. Benefits of this medication outweigh potential harm.  Patient has been encouraged to continue taking this medication.  .      Patient Active Problem List   Diagnosis   • Atherosclerosis of coronary artery   • Biceps tendinitis of right upper extremity   • Heart attack   • Heartburn   • Hyperlipidemia   • Nasal fracture   • Right rotator cuff tear   • Vitamin D deficiency   • Hypertension, essential     Advance Care Planning   Advance Care Planning     Advance Directive is not on file.  ACP discussion was held with the patient during this visit. Patient does not have an advance directive, declines further assistance.     Objective    Vitals:    05/24/23 1423   BP: 131/65   Pulse: 90   SpO2: 99%   Weight: 81.6 kg (180 lb)   Height: 166.4 cm (65.5\")   PainSc: 0-No pain     Estimated body mass index is 29.5 kg/m² as calculated from the following:    Height as of this encounter: 166.4 cm (65.5\").    Weight as of this encounter: 81.6 kg (180 lb).    BMI is >= 25 and <30. (Overweight) The following options were offered after discussion;: exercise counseling/recommendations and nutrition counseling/recommendations      Does the patient have evidence of cognitive impairment?   No            HEALTH RISK ASSESSMENT    Smoking Status:  Social History     Tobacco Use   Smoking Status Never   Smokeless Tobacco Never     Alcohol Consumption:  Social History     Substance and Sexual Activity   Alcohol Use Never     Fall Risk Screen:    STEADI Fall Risk Assessment was completed, " and patient is at LOW risk for falls.Assessment completed on:2023    Depression Screenin/24/2023     2:00 PM   PHQ-2/PHQ-9 Depression Screening   Little Interest or Pleasure in Doing Things 0-->not at all   Feeling Down, Depressed or Hopeless 1-->several days   PHQ-9: Brief Depression Severity Measure Score 1       Health Habits and Functional and Cognitive Screenin/24/2023     2:00 PM   Functional & Cognitive Status   Do you have difficulty preparing food and eating? No   Do you have difficulty bathing yourself, getting dressed or grooming yourself? No   Do you have difficulty using the toilet? No   Do you have difficulty moving around from place to place? No   Do you have trouble with steps or getting out of a bed or a chair? No   Current Diet Well Balanced Diet   Dental Exam Up to date   Eye Exam Up to date   Exercise (times per week) 5 times per week   Current Exercises Include Yard Work   Do you need help using the phone?  No   Are you deaf or do you have serious difficulty hearing?  No   Do you need help with transportation? No   Do you need help shopping? No   Do you need help preparing meals?  No   Do you need help with housework?  No   Do you need help with laundry? No   Do you need help taking your medications? No   Do you need help managing money? No   Do you ever drive or ride in a car without wearing a seat belt? No   Have you felt unusual stress, anger or loneliness in the last month? No   Who do you live with? Spouse   If you need help, do you have trouble finding someone available to you? No   Have you been bothered in the last four weeks by sexual problems? No   Do you have difficulty concentrating, remembering or making decisions? No       Age-appropriate Screening Schedule:  Refer to the list below for future screening recommendations based on patient's age, sex and/or medical conditions. Orders for these recommended tests are listed in the plan section. The patient has been  provided with a written plan.    Health Maintenance   Topic Date Due   • HEPATITIS C SCREENING  Never done   • DXA SCAN  02/26/2023   • TDAP/TD VACCINES (1 - Tdap) 11/29/2023 (Originally 5/31/1964)   • ZOSTER VACCINE (1 of 2) 05/24/2024 (Originally 5/31/1993)   • INFLUENZA VACCINE  08/01/2023   • LIPID PANEL  10/31/2023   • ANNUAL WELLNESS VISIT  05/24/2024   • COVID-19 Vaccine  Completed   • Pneumococcal Vaccine 65+  Completed                  CMS Preventative Services Quick Reference  Risk Factors Identified During Encounter:    None Identified    The above risks/problems have been discussed with the patient.  Pertinent information has been shared with the patient in the After Visit Summary.    Diagnoses and all orders for this visit:    1. Medicare annual wellness visit, subsequent (Primary)    2. Need for pneumococcal vaccination  -     Pneumococcal Conjugate Vaccine 20-Valent (PCV20)        Follow Up:   Next Medicare Wellness visit to be scheduled in 1 year.      An After Visit Summary and PPPS were made available to the patient.

## 2023-08-01 ENCOUNTER — OFFICE VISIT (OUTPATIENT)
Dept: ORTHOPEDIC SURGERY | Facility: CLINIC | Age: 78
End: 2023-08-01
Payer: MEDICARE

## 2023-08-01 VITALS — BODY MASS INDEX: 29.82 KG/M2 | WEIGHT: 179 LBS | HEIGHT: 65 IN

## 2023-08-01 DIAGNOSIS — S42.202D CLOSED FRACTURE OF PROXIMAL END OF LEFT HUMERUS WITH ROUTINE HEALING, UNSPECIFIED FRACTURE MORPHOLOGY, SUBSEQUENT ENCOUNTER: Primary | ICD-10-CM

## 2023-08-19 DIAGNOSIS — E78.5 HYPERLIPIDEMIA, UNSPECIFIED HYPERLIPIDEMIA TYPE: ICD-10-CM

## 2023-08-21 RX ORDER — PITAVASTATIN CALCIUM 4.18 MG/1
TABLET, FILM COATED ORAL
Qty: 45 TABLET | Refills: 3 | Status: SHIPPED | OUTPATIENT
Start: 2023-08-21

## 2023-08-29 ENCOUNTER — OFFICE VISIT (OUTPATIENT)
Dept: ORTHOPEDIC SURGERY | Facility: CLINIC | Age: 78
End: 2023-08-29
Payer: MEDICARE

## 2023-08-29 VITALS
HEART RATE: 83 BPM | WEIGHT: 179 LBS | HEIGHT: 65 IN | OXYGEN SATURATION: 95 % | DIASTOLIC BLOOD PRESSURE: 88 MMHG | SYSTOLIC BLOOD PRESSURE: 137 MMHG | BODY MASS INDEX: 29.82 KG/M2

## 2023-08-29 DIAGNOSIS — S42.202D CLOSED FRACTURE OF PROXIMAL END OF LEFT HUMERUS WITH ROUTINE HEALING, UNSPECIFIED FRACTURE MORPHOLOGY, SUBSEQUENT ENCOUNTER: Primary | ICD-10-CM

## 2023-08-29 NOTE — PROGRESS NOTES
"Chief Complaint  Follow-up of the Left Shoulder    Subjective      Shreya Marino presents to [facility] for follow-up of left proximal humerus fracture sustained in injury on 6/23/2023.  Patient has been managed conservatively/nonoperatively.  She presents today out of sling.  She reports that her shoulder \"hurts today\", although does state that she had PT yesterday.  Reports that her shoulder is \"better at times, but some things make it hurt\".  She does have difficulties doing her hair, raising her arm above her head, putting dishes away, etc.  She has been participating in outpatient physical therapy through PT practices with note reporting left shoulder forward flexion to 117 degrees, abduction 93 degrees, and internal rotation to T9.    Objective   No Known Allergies    Vital Signs:   /88   Pulse 83   Ht 165.1 cm (65\")   Wt 81.2 kg (179 lb)   SpO2 95%   BMI 29.79 kg/mý       Physical Exam    Constitutional: Awake, alert. Well nourished appearance.    Integumentary: Warm, dry, intact. No obvious rashes.    HENT: Atraumatic, normocephalic.   Respiratory: Non labored respirations .   Cardiovascular: Intact peripheral pulses.    Psychiatric: Normal mood and affect. A&O X3    Ortho Exam  Left shoulder: Tenderness to palpation of proximal humerus.  No obvious edema or ecchymosis.  Active shoulder forward flexion to 140 degrees, abduction 130 degrees, and internal rotation to mid thoracic.  Full flexion and extension of the wrist and elbow.  Full pronation and supination.  Patient is able to form a full fist.  Sensation intact light touch.  Distal neurovascular intact.    Imaging Results (Most Recent)       Procedure Component Value Units Date/Time    XR Humerus Left [[id number]] Resulted: 08/31/23 1019     Updated: 08/31/23 1020    Narrative:      X-Ray Report:  Study: X-rays ordered, taken in the office, and reviewed today.   Site: Left humerus Xray  Indication: " Fracture  View: AP/Lateral view(s)  Findings: Continued interval improvement noted in healing of left proximal   humerus fracture.  Stable alignment.  Prior studies available for comparison: yes                       Assessment and Plan   Problem List Items Addressed This Visit    None  Visit Diagnoses       Closed fracture of proximal end of left humerus with routine healing, unspecified fracture morphology, subsequent encounter    -  Primary    Relevant Orders    XR Humerus Left (Completed)            Follow Up   Return in about 6 weeks (around 10/10/2023).    Tobacco Use: Low Risk     Smoking Tobacco Use: Never    Smokeless Tobacco Use: Never    Passive Exposure: Not on file     Patient is a non-smoker.  Did not discuss tobacco cessation options.    Patient Instructions   X-rays taken and reviewed. Patient continues to progress. Advised to continue PT to completion to progress strength and ROM. Continue home exercises.     Continue icing as needed up to 4 times daily for no longer than 15-20 mins at a time.     Follow-up in 6 weeks. Repeat x-rays needed. Call with changes or concerns.     Patient was given instructions and counseling regarding her condition or for health maintenance advice. Please see specific information pulled into the AVS if appropriate.

## 2023-08-29 NOTE — PATIENT INSTRUCTIONS
X-rays taken and reviewed. Patient continues to progress. Advised to continue PT to completion to progress strength and ROM. Continue home exercises.     Continue icing as needed up to 4 times daily for no longer than 15-20 mins at a time.     Follow-up in 6 weeks. Repeat x-rays needed. Call with changes or concerns.

## 2023-10-12 ENCOUNTER — OFFICE VISIT (OUTPATIENT)
Dept: ORTHOPEDIC SURGERY | Facility: CLINIC | Age: 78
End: 2023-10-12
Payer: MEDICARE

## 2023-10-12 VITALS
BODY MASS INDEX: 29.82 KG/M2 | HEIGHT: 65 IN | WEIGHT: 179 LBS | SYSTOLIC BLOOD PRESSURE: 124 MMHG | HEART RATE: 69 BPM | DIASTOLIC BLOOD PRESSURE: 86 MMHG | OXYGEN SATURATION: 92 %

## 2023-10-12 DIAGNOSIS — S42.202D CLOSED FRACTURE OF PROXIMAL END OF LEFT HUMERUS WITH ROUTINE HEALING, UNSPECIFIED FRACTURE MORPHOLOGY, SUBSEQUENT ENCOUNTER: Primary | ICD-10-CM

## 2023-10-12 DIAGNOSIS — M25.512 LEFT SHOULDER PAIN, UNSPECIFIED CHRONICITY: ICD-10-CM

## 2023-10-12 NOTE — PROGRESS NOTES
"Chief Complaint  Follow-up and Pain of the Left Shoulder    Subjective      Shreya Marino presents to [facility] for follow up of her left arm. She sustained an injury on 6/23/23 that resulted in a left proximal humerus fracture that we have been treating nonoperatively. She presents today with worsening pain. She has finished PT and \"isn't doing well\". She doesn't want to take any pain medications. She has taken a few doses of tylenol. She isn't able to take any NSAIDs. She also uses a topical cream. She uses a heating pad at night.     No Known Allergies    Objective     Vital Signs:   Vitals:    10/12/23 1430   BP: 124/86   Pulse: 69   SpO2: 92%   Weight: 81.2 kg (179 lb)   Height: 165.1 cm (65\")     Body mass index is 29.79 kg/mý.    I reviewed the patient's chief complaint, history of present illness, review of systems, past medical history, surgical history, family history, social history, medications, and allergy list.     REVIEW OF SYSTEMS    Constitutional: Denies fevers, chills, weight loss  Cardiovascular: Denies chest pain, shortness of breath  Skin: Denies rashes, acute skin changes  Neurologic: Denies headache, loss of consciousness  MSK: Left shoulder pain .     Ortho Exam  Left upper extremity: Active shoulder forward extension at 130 degrees, external rotation to mid lumbar spine, active shoulder adduction to 110 degrees.     Imaging:  Narrative & Impression   X-Ray Report:  Study: X-rays ordered, taken in the office, and reviewed today  Site: Left humerus xray  Indication: Left proximal humerus fracture follow-up  View: AP/Lateral view(s)  Findings: Proximal humerus fracture is well aligned, stable and nondisplaced.  Continued callus formation is noted.   Prior studies available for comparison: yes               Assessment and Plan   Diagnoses and all orders for this visit:    1. Closed fracture of proximal end of left humerus with routine healing, unspecified " fracture morphology, subsequent encounter (Primary)  -     XR Humerus Left  -     Diclofenac Sodium (VOLTAREN) 1 % gel gel; Apply 4 g topically to the appropriate area as directed 4 (Four) Times a Day As Needed (pain).  Dispense: 100 g; Refill: 1  -     Ambulatory Referral to Physical Therapy Evaluate and treat; Strengthening, ROM, Stretching    2. Left shoulder pain, unspecified chronicity  -     Diclofenac Sodium (VOLTAREN) 1 % gel gel; Apply 4 g topically to the appropriate area as directed 4 (Four) Times a Day As Needed (pain).  Dispense: 100 g; Refill: 1  -     Ambulatory Referral to Physical Therapy Evaluate and treat; Strengthening, ROM, Stretching         Shreya is here today following up on a left proximal humerus fracture that has been managed nonoperatively. Xrays were taken and reviewed today. Discussed steroid injection today to help calm the inflammation and help with pain however patient declined. We resubmitted an order for PT and also prescribed a topical Voltaren gel. Continue home exercises. Follow up as needed due to insurance coverage.     Follow Up   Return if symptoms worsen or fail to improve.  There are no Patient Instructions on file for this visit.  Patient was given instructions and counseling regarding her condition or for health maintenance advice. Please see specific information pulled into the AVS if appropriate.

## 2024-01-16 ENCOUNTER — OFFICE VISIT (OUTPATIENT)
Dept: CARDIOLOGY | Facility: CLINIC | Age: 79
End: 2024-01-16
Payer: MEDICARE

## 2024-01-16 ENCOUNTER — LAB (OUTPATIENT)
Dept: LAB | Facility: HOSPITAL | Age: 79
End: 2024-01-16
Payer: MEDICARE

## 2024-01-16 VITALS
BODY MASS INDEX: 30.32 KG/M2 | SYSTOLIC BLOOD PRESSURE: 137 MMHG | WEIGHT: 182 LBS | DIASTOLIC BLOOD PRESSURE: 85 MMHG | HEART RATE: 103 BPM | HEIGHT: 65 IN

## 2024-01-16 DIAGNOSIS — R06.09 DYSPNEA ON EXERTION: ICD-10-CM

## 2024-01-16 DIAGNOSIS — R42 POSTURAL DIZZINESS: ICD-10-CM

## 2024-01-16 DIAGNOSIS — R06.09 DYSPNEA ON EXERTION: Primary | ICD-10-CM

## 2024-01-16 DIAGNOSIS — I10 PRIMARY HYPERTENSION: ICD-10-CM

## 2024-01-16 DIAGNOSIS — E78.2 MIXED HYPERLIPIDEMIA: ICD-10-CM

## 2024-01-16 DIAGNOSIS — I25.10 CORONARY ARTERY DISEASE INVOLVING NATIVE CORONARY ARTERY OF NATIVE HEART WITHOUT ANGINA PECTORIS: ICD-10-CM

## 2024-01-16 LAB
ANION GAP SERPL CALCULATED.3IONS-SCNC: 12 MMOL/L (ref 5–15)
BUN SERPL-MCNC: 11 MG/DL (ref 8–23)
BUN/CREAT SERPL: 12.6 (ref 7–25)
CALCIUM SPEC-SCNC: 10.9 MG/DL (ref 8.6–10.5)
CHLORIDE SERPL-SCNC: 101 MMOL/L (ref 98–107)
CO2 SERPL-SCNC: 26 MMOL/L (ref 22–29)
CREAT SERPL-MCNC: 0.87 MG/DL (ref 0.57–1)
DEPRECATED RDW RBC AUTO: 43 FL (ref 37–54)
EGFRCR SERPLBLD CKD-EPI 2021: 67.4 ML/MIN/1.73
ERYTHROCYTE [DISTWIDTH] IN BLOOD BY AUTOMATED COUNT: 12.4 % (ref 12.3–15.4)
GLUCOSE SERPL-MCNC: 96 MG/DL (ref 65–99)
HCT VFR BLD AUTO: 43.4 % (ref 34–46.6)
HGB BLD-MCNC: 14.6 G/DL (ref 12–15.9)
MCH RBC QN AUTO: 31.7 PG (ref 26.6–33)
MCHC RBC AUTO-ENTMCNC: 33.6 G/DL (ref 31.5–35.7)
MCV RBC AUTO: 94.3 FL (ref 79–97)
NT-PROBNP SERPL-MCNC: 119 PG/ML (ref 0–1800)
PLATELET # BLD AUTO: 290 10*3/MM3 (ref 140–450)
PMV BLD AUTO: 10.8 FL (ref 6–12)
POTASSIUM SERPL-SCNC: 4.7 MMOL/L (ref 3.5–5.2)
RBC # BLD AUTO: 4.6 10*6/MM3 (ref 3.77–5.28)
SODIUM SERPL-SCNC: 139 MMOL/L (ref 136–145)
TSH SERPL DL<=0.05 MIU/L-ACNC: 2.9 UIU/ML (ref 0.27–4.2)
WBC NRBC COR # BLD AUTO: 8.29 10*3/MM3 (ref 3.4–10.8)

## 2024-01-16 PROCEDURE — 36415 COLL VENOUS BLD VENIPUNCTURE: CPT

## 2024-01-16 PROCEDURE — 85027 COMPLETE CBC AUTOMATED: CPT

## 2024-01-16 PROCEDURE — 80048 BASIC METABOLIC PNL TOTAL CA: CPT

## 2024-01-16 PROCEDURE — 84443 ASSAY THYROID STIM HORMONE: CPT

## 2024-01-16 PROCEDURE — 83880 ASSAY OF NATRIURETIC PEPTIDE: CPT

## 2024-01-16 PROCEDURE — 3079F DIAST BP 80-89 MM HG: CPT | Performed by: INTERNAL MEDICINE

## 2024-01-16 PROCEDURE — 3075F SYST BP GE 130 - 139MM HG: CPT | Performed by: INTERNAL MEDICINE

## 2024-01-16 RX ORDER — NEBIVOLOL 5 MG/1
5 TABLET ORAL DAILY
Qty: 90 TABLET | Refills: 3 | Status: SHIPPED | OUTPATIENT
Start: 2024-01-16

## 2024-01-16 NOTE — PROGRESS NOTES
Chief Complaint  Coronary Artery Disease, Hypertension, Dizziness, Edema, and Fatigue    Subjective        Shreya Marino presents to [facility] CARDIOLOG[facility]  History of present illness:    Patient has noted some dizzy spells.  They appear to happen more when she goes from lying or sitting to standing.  They also note that she has been very fatigued and will easily fall asleep.  They also note some short-term memory problems.  She notes occasional edema that is mainly in the ankles and bilateral.  She does drink a lot of water and 1 cup of coffee and at most 2 iced teas.      Past Medical History:   Diagnosis Date    Atherosclerosis of coronary artery 07/06/2016    with previous stent/PCI (June 2016);     Biceps tendinitis of right upper extremity 04/25/2018    CAD (coronary artery disease) 07/06/2016    Chest pain     CHF (congestive heart failure)     Heart attack     Heart burn     HLD (hyperlipidemia) 01/28/2021    Hypertension, essential 10/20/2021    Nasal fracture     Right rotator cuff tear 04/25/2018    Right shoulder pain     SLAP tear of shoulder 04/25/2018    Subacromial impingement of right shoulder 04/18/2018    Vitamin D deficiency 01/28/2021         Past Surgical History:   Procedure Laterality Date    BREAST BIOPSY Left     CAROTID STENT      CATARACT EXTRACTION      CORONARY ANGIOPLASTY WITH STENT PLACEMENT      HYSTERECTOMY      NASAL SEPTUM SURGERY  07/10/2018    CLOSED REDUCTION, FRACTURE    RENAL ARTERY STENT      VEIN SURGERY            Social History     Socioeconomic History    Marital status:    Tobacco Use    Smoking status: Never    Smokeless tobacco: Never   Vaping Use    Vaping Use: Never used   Substance and Sexual Activity    Alcohol use: Never    Drug use: Never    Sexual activity: Not Currently     Partners: Female     Birth control/protection: Condom         Family History   Problem Relation Age of Onset    Breast cancer Other     Heart disease Other      "Cancer Other     Heart attack Mother         Death 2022          No Known Allergies         Current Outpatient Medications:     aspirin 81 MG EC tablet, aspirin 81 mg oral tablet,delayed release (DR/EC) take 1 tablet (81 mg) by oral route once daily   Active, Disp: , Rfl:     BIOTIN PO, Take  by mouth., Disp: , Rfl:     cetirizine (zyrTEC) 10 MG tablet, Take 1 tablet by mouth Daily., Disp: , Rfl:     Cholecalciferol 25 MCG (1000 UT) capsule, Vitamin D3 1,000 unit oral capsule take 1 capsule by oral route daily   Active, Disp: , Rfl:     Diclofenac Sodium (VOLTAREN) 1 % gel gel, Apply 4 g topically to the appropriate area as directed 4 (Four) Times a Day As Needed (pain)., Disp: 100 g, Rfl: 1    ezetimibe (ZETIA) 10 MG tablet, Take 1 tablet by mouth Daily., Disp: 90 tablet, Rfl: 3    Livalo 4 MG tablet, TAKE 1/2 TABLET BY MOUTH DAILY, Disp: 45 tablet, Rfl: 3    nitroglycerin (NITROSTAT) 0.4 MG SL tablet, 1 under the tongue as needed for angina, may repeat q5mins for up three doses, Disp: 25 tablet, Rfl: 2    oxybutynin XL (DITROPAN-XL) 5 MG 24 hr tablet, Take 1 tablet by mouth Daily., Disp: 90 tablet, Rfl: 1    vitamin B-12 (CYANOCOBALAMIN) 1000 MCG tablet, Take 1 tablet by mouth Daily., Disp: , Rfl:     nebivolol (Bystolic) 5 MG tablet, Take 1 tablet by mouth Daily., Disp: 90 tablet, Rfl: 3      ROS:  Cardiac review of systems positive for dizziness fatigue sleepiness and short-term memory loss along with ankle swelling    Objective     /85   Pulse 103   Ht 165.1 cm (65\")   Wt 82.6 kg (182 lb)   BMI 30.29 kg/m²       General Appearance:   well developed  well nourished  HENT:   oropharynx moist  lips not cyanotic  Respiratory:  no respiratory distress  normal breath sounds  no rales  Cardiovascular:  no jugular venous distention  regular rhythm  S1 normal, S2 normal  no S3, no S4   no murmur  no rub, no thrill  No carotid bruit  pedal pulses normal  lower extremity edema: none    Musculoskeletal:  no " clubbing of fingers.   normocephalic, head atraumatic  Skin:   warm, dry  Psychiatric:  judgement and insight appropriate  normal mood and affect    ECHO:  Results for orders placed during the hospital encounter of 10/03/22    Adult Transthoracic Echo Complete W/ Cont if Necessary Per Protocol    Interpretation Summary  Normal left ventricular systolic function with an estimated ejection fraction of 65-70%.  No regional wall motion abnormalities were observed.  Left ventricular diastolic function is consistent with impaired relaxation (grade I diastolic dysfunction).  Moderate concentric left ventricular hypertrophy.  Trace aortic regurgitation, but no hemodynamically significant valvular pathology.  Right ventricular systolic pressure could not be accurately estimated due to an insufficient TR velocity profile.    STRESS:    CATH:  No results found for this or any previous visit.    BMP:     Glucose   Date Value Ref Range Status   10/31/2022 100 (H) 65 - 99 mg/dL Final     BUN   Date Value Ref Range Status   10/31/2022 11 8 - 23 mg/dL Final     Creatinine   Date Value Ref Range Status   10/31/2022 0.80 0.57 - 1.00 mg/dL Final     Sodium   Date Value Ref Range Status   10/31/2022 141 136 - 145 mmol/L Final     Potassium   Date Value Ref Range Status   10/31/2022 4.4 3.5 - 5.2 mmol/L Final     Chloride   Date Value Ref Range Status   10/31/2022 105 98 - 107 mmol/L Final     CO2   Date Value Ref Range Status   10/31/2022 27.4 22.0 - 29.0 mmol/L Final     Calcium   Date Value Ref Range Status   10/31/2022 10.3 8.6 - 10.5 mg/dL Final     BUN/Creatinine Ratio   Date Value Ref Range Status   10/31/2022 13.8 7.0 - 25.0 Final     Anion Gap   Date Value Ref Range Status   10/31/2022 8.6 5.0 - 15.0 mmol/L Final     eGFR   Date Value Ref Range Status   10/31/2022 75.1 >60.0 mL/min/1.73 Final     Comment:     National Kidney Foundation and American Society of Nephrology (ASN) Task Force recommended calculation based on the  Chronic Kidney Disease Epidemiology Collaboration (CKD-EPI) equation refit without adjustment for race.     LIPIDS:  Total Cholesterol   Date Value Ref Range Status   10/31/2022 155 0 - 200 mg/dL Final     Triglycerides   Date Value Ref Range Status   10/31/2022 164 (H) 0 - 150 mg/dL Final     HDL Cholesterol   Date Value Ref Range Status   10/31/2022 44 40 - 60 mg/dL Final     LDL Cholesterol    Date Value Ref Range Status   10/31/2022 83 0 - 100 mg/dL Final     VLDL Cholesterol   Date Value Ref Range Status   10/31/2022 28 5 - 40 mg/dL Final     LDL/HDL Ratio   Date Value Ref Range Status   10/31/2022 1.78  Final           ECG 12 Lead    Date/Time: 1/16/2024 3:23 PM  Performed by: Beni Cueva MD    Authorized by: Beni Cueva MD  Comparison: compared with previous ECG from 10/21/2021  Similar to previous ECG  Comparison to previous ECG: 3 PACs noted.                   ASSESSMENT:  Diagnoses and all orders for this visit:    1. Dyspnea on exertion (Primary)  -     CBC (No Diff); Future  -     Basic Metabolic Panel; Future  -     proBNP; Future  -     TSH; Future  -     Adult Transthoracic Echo Complete W/ Cont if Necessary Per Protocol; Future    2. Postural dizziness  -     CBC (No Diff); Future  -     Basic Metabolic Panel; Future  -     proBNP; Future  -     TSH; Future  -     Adult Transthoracic Echo Complete W/ Cont if Necessary Per Protocol; Future    3. Coronary artery disease involving native coronary artery of native heart without angina pectoris    4. Primary hypertension    5. Mixed hyperlipidemia    Other orders  -     nebivolol (Bystolic) 5 MG tablet; Take 1 tablet by mouth Daily.  Dispense: 90 tablet; Refill: 3         PLAN:    1.  Checked orthostatic blood pressure measurements on the patient and her systolic blood pressure dropped from 135 mmHg down to 119 mmHg.  Her heart rate increased from 71 up to 101 bpm.  I have asked the patient to continue to drink plenty of water.  2.   We will switch patient's hydrochlorothiazide to Bystolic 5 daily.  I want to see if this helps with her orthostasis.  3.  Checked an EKG on the patient which showed normal sinus rhythm with PACs.  There was low voltage.  4.  For patient's dizziness we will check an echocardiogram.  5.  For patient's edema, fatigue and dizziness we will check baseline labs including CBC, TSH, BMP, and beta natruretic peptide.  6.  Continue the aspirin, Livalo and Zetia.      Return in about 6 weeks (around 2/27/2024).     Patient was given instructions and counseling regarding her condition or for health maintenance advice. Please see specific information pulled into the AVS if appropriate.         Beni Cueva MD   1/16/2024  15:19 EST

## 2024-02-26 ENCOUNTER — TELEPHONE (OUTPATIENT)
Dept: CARDIOLOGY | Facility: CLINIC | Age: 79
End: 2024-02-26
Payer: MEDICARE

## 2024-02-26 NOTE — TELEPHONE ENCOUNTER
PT'S TESTING RESULTS ARE INCOMPLETE. PT'S APPT CANCELLED, LEFT MESSAGE ON CELL PHONE LETTING HER KNOW. ALSO SENDING "[employer]G. ADVISED PT TO RETURN CALL TO R/S F/U WITH TEX ONCE TESTING IS COMPLETE.    PT CAN BE SCHEDULED A F/U WITH [facility] AROUND THE WEEK OF 3/25 IF SHE CALLS BACK.

## 2024-02-27 ENCOUNTER — TELEPHONE (OUTPATIENT)
Dept: CARDIOLOGY | Facility: CLINIC | Age: 79
End: 2024-02-27
Payer: MEDICARE

## 2024-02-27 NOTE — TELEPHONE ENCOUNTER
Called pt to schedule follow up past her ECHO appt. No answer left vmail.  Hub if pt calls back please schedule pt one week past her echo appt with Amanda SHARIF.  Thanks.

## 2024-03-18 ENCOUNTER — HOSPITAL ENCOUNTER (OUTPATIENT)
Dept: CARDIOLOGY | Facility: HOSPITAL | Age: 79
Discharge: HOME | End: 2024-03-18
Admitting: INTERNAL MEDICINE
Payer: MEDICARE

## 2024-03-18 DIAGNOSIS — R06.09 DYSPNEA ON EXERTION: ICD-10-CM

## 2024-03-18 DIAGNOSIS — R42 POSTURAL DIZZINESS: ICD-10-CM

## 2024-03-18 LAB
BH CV ECHO MEAS - AO MAX PG: 7.4 MMHG
BH CV ECHO MEAS - AO MEAN PG: 5.6 MMHG
BH CV ECHO MEAS - AO V2 MAX: 135.7 CM/SEC
BH CV ECHO MEAS - AO V2 VTI: 38.1 CM
BH CV ECHO MEAS - AVA(I,D): 2.8 CM2
BH CV ECHO MEAS - EDV(CUBED): 34.3 ML
BH CV ECHO MEAS - EDV(MOD-SP2): 21.5 ML
BH CV ECHO MEAS - EDV(MOD-SP4): 35.4 ML
BH CV ECHO MEAS - EF(MOD-BP): 66.5 %
BH CV ECHO MEAS - EF(MOD-SP2): 64.6 %
BH CV ECHO MEAS - EF(MOD-SP4): 72.4 %
BH CV ECHO MEAS - ESV(CUBED): 14.6 ML
BH CV ECHO MEAS - ESV(MOD-SP2): 7.6 ML
BH CV ECHO MEAS - ESV(MOD-SP4): 9.8 ML
BH CV ECHO MEAS - FS: 24.7 %
BH CV ECHO MEAS - IVS/LVPW: 0.94 CM
BH CV ECHO MEAS - IVSD: 1.03 CM
BH CV ECHO MEAS - LA DIMENSION: 4.1 CM
BH CV ECHO MEAS - LAT PEAK E' VEL: 5.8 CM/SEC
BH CV ECHO MEAS - LV DIASTOLIC VOL/BSA (35-75): 18.6 CM2
BH CV ECHO MEAS - LV MASS(C)D: 100.7 GRAMS
BH CV ECHO MEAS - LV MAX PG: 7.3 MMHG
BH CV ECHO MEAS - LV MEAN PG: 4.5 MMHG
BH CV ECHO MEAS - LV SYSTOLIC VOL/BSA (12-30): 5.1 CM2
BH CV ECHO MEAS - LV V1 MAX: 135.1 CM/SEC
BH CV ECHO MEAS - LV V1 VTI: 35.1 CM
BH CV ECHO MEAS - LVIDD: 3.2 CM
BH CV ECHO MEAS - LVIDS: 2.45 CM
BH CV ECHO MEAS - LVOT AREA: 3.1 CM2
BH CV ECHO MEAS - LVOT DIAM: 1.98 CM
BH CV ECHO MEAS - LVPWD: 1.09 CM
BH CV ECHO MEAS - MED PEAK E' VEL: 6 CM/SEC
BH CV ECHO MEAS - MV A MAX VEL: 108.4 CM/SEC
BH CV ECHO MEAS - MV DEC SLOPE: 291.7 CM/SEC2
BH CV ECHO MEAS - MV DEC TIME: 0.27 SEC
BH CV ECHO MEAS - MV E MAX VEL: 84.4 CM/SEC
BH CV ECHO MEAS - MV E/A: 0.78
BH CV ECHO MEAS - MV MEAN PG: 3.9 MMHG
BH CV ECHO MEAS - MV P1/2T: 98.1 MSEC
BH CV ECHO MEAS - MV V2 VTI: 43.3 CM
BH CV ECHO MEAS - MVA(P1/2T): 2.24 CM2
BH CV ECHO MEAS - MVA(VTI): 2.49 CM2
BH CV ECHO MEAS - PA V2 MAX: 89.6 CM/SEC
BH CV ECHO MEAS - RVDD: 2.6 CM
BH CV ECHO MEAS - SI(MOD-SP2): 7.3 ML/M2
BH CV ECHO MEAS - SI(MOD-SP4): 13.5 ML/M2
BH CV ECHO MEAS - SV(LVOT): 107.7 ML
BH CV ECHO MEAS - SV(MOD-SP2): 13.9 ML
BH CV ECHO MEAS - SV(MOD-SP4): 25.6 ML
BH CV ECHO MEAS - TAPSE (>1.6): 1.66 CM
BH CV ECHO MEASUREMENTS AVERAGE E/E' RATIO: 14.31
LEFT ATRIUM VOLUME INDEX: 17.8 ML/M2

## 2024-03-18 PROCEDURE — 93306 TTE W/DOPPLER COMPLETE: CPT | Performed by: INTERNAL MEDICINE

## 2024-03-18 PROCEDURE — 93306 TTE W/DOPPLER COMPLETE: CPT

## 2024-03-26 ENCOUNTER — OFFICE VISIT (OUTPATIENT)
Dept: CARDIOLOGY | Facility: CLINIC | Age: 79
End: 2024-03-26
Payer: MEDICARE

## 2024-03-26 VITALS
SYSTOLIC BLOOD PRESSURE: 111 MMHG | HEART RATE: 75 BPM | BODY MASS INDEX: 30.69 KG/M2 | WEIGHT: 184.2 LBS | DIASTOLIC BLOOD PRESSURE: 65 MMHG | HEIGHT: 65 IN

## 2024-03-26 DIAGNOSIS — E78.5 HYPERLIPIDEMIA, UNSPECIFIED HYPERLIPIDEMIA TYPE: ICD-10-CM

## 2024-03-26 DIAGNOSIS — I25.10 CORONARY ARTERY DISEASE INVOLVING NATIVE CORONARY ARTERY OF NATIVE HEART WITHOUT ANGINA PECTORIS: ICD-10-CM

## 2024-03-26 DIAGNOSIS — I10 PRIMARY HYPERTENSION: Primary | ICD-10-CM

## 2024-03-26 DIAGNOSIS — R42 POSTURAL DIZZINESS: ICD-10-CM

## 2024-03-26 RX ORDER — NEBIVOLOL 2.5 MG/1
2.5 TABLET ORAL DAILY
Qty: 30 TABLET | Refills: 3 | Status: SHIPPED | OUTPATIENT
Start: 2024-03-26

## 2024-03-26 RX ORDER — PITAVASTATIN CALCIUM 2.09 MG/1
2 TABLET, FILM COATED ORAL NIGHTLY
Qty: 90 TABLET | Refills: 3 | Status: SHIPPED | OUTPATIENT
Start: 2024-03-26

## 2024-03-26 RX ORDER — FAMOTIDINE 20 MG/1
20 TABLET, FILM COATED ORAL 2 TIMES DAILY
COMMUNITY

## 2024-03-26 RX ORDER — MULTIPLE VITAMINS W/ MINERALS TAB 9MG-400MCG
1 TAB ORAL DAILY
COMMUNITY

## 2024-03-26 NOTE — PROGRESS NOTES
Chief Complaint  Hypertension, Hyperlipidemia, and Follow-up (F/U post testing.  Testing is completed.  Pt is having some fatigue.  Pt is having some side effects since being put on the Nebivolol. )    Subjective        History of Present Illness  Shreya Marino presents to [facility]ARDIOLOG[facility] for follow up.  Patient is an 78-year-old female with past medical history outlined below, significant for coronary artery disease, hypertension, hyperlipidemia who presents for follow-up.  She is doing fairly well from a cardiac standpoint.  She has no cardiac complaints.  She denies chest pain or discomfort.  She has no shortness of breath.  She has no edema, dizziness or lightheadedness.  These have resolved since starting Bystolic.  However she has been experiencing some diarrhea since starting the Bystolic.  It comes and goes.  She is not sure is related to the Bystolic since that does resolve periodically.      Past Medical History:   Diagnosis Date    Atherosclerosis of coronary artery 07/06/2016    with previous stent/PCI (June 2016);     Biceps tendinitis of right upper extremity 04/25/2018    CAD (coronary artery disease) 07/06/2016    Chest pain     CHF (congestive heart failure)     Heart attack     Heart burn     HLD (hyperlipidemia) 01/28/2021    Hypertension, essential 10/20/2021    Nasal fracture     Right rotator cuff tear 04/25/2018    Right shoulder pain     SLAP tear of shoulder 04/25/2018    Subacromial impingement of right shoulder 04/18/2018    Vitamin D deficiency 01/28/2021       ALLERGY  No Known Allergies     Past Surgical History:   Procedure Laterality Date    BREAST BIOPSY Left     CAROTID STENT      CATARACT EXTRACTION      CORONARY ANGIOPLASTY WITH STENT PLACEMENT      HYSTERECTOMY      NASAL SEPTUM SURGERY  07/10/2018    CLOSED REDUCTION, FRACTURE    RENAL ARTERY STENT      VEIN SURGERY          Social History     Socioeconomic History    Marital status:    Tobacco  Use    Smoking status: Never    Smokeless tobacco: Never   Vaping Use    Vaping status: Never Used   Substance and Sexual Activity    Alcohol use: Never    Drug use: Never    Sexual activity: Not Currently     Partners: Female     Birth control/protection: Condom       Family History   Problem Relation Age of Onset    Breast cancer Other     Heart disease Other     Cancer Other     Heart attack Mother         Death 2022        Current Outpatient Medications on File Prior to Visit   Medication Sig    aspirin 81 MG EC tablet aspirin 81 mg oral tablet,delayed release (DR/EC) take 1 tablet (81 mg) by oral route once daily   Active    BIOTIN PO Take  by mouth.    Calcium Carbonate-Vit D-Min (CALTRATE 600+D PLUS PO) Take 1 tablet by mouth Daily.    cetirizine (zyrTEC) 10 MG tablet Take 1 tablet by mouth Daily.    Diclofenac Sodium (VOLTAREN) 1 % gel gel Apply 4 g topically to the appropriate area as directed 4 (Four) Times a Day As Needed (pain).    ezetimibe (ZETIA) 10 MG tablet Take 1 tablet by mouth Daily.    famotidine (PEPCID) 20 MG tablet Take 1 tablet by mouth 2 (Two) Times a Day.    multivitamin with minerals tablet tablet Take 1 tablet by mouth Daily.    nitroglycerin (NITROSTAT) 0.4 MG SL tablet 1 under the tongue as needed for angina, may repeat q5mins for up three doses    vitamin B-12 (CYANOCOBALAMIN) 1000 MCG tablet Take 1 tablet by mouth Daily.    [DISCONTINUED] Livalo 4 MG tablet TAKE 1/2 TABLET BY MOUTH DAILY    [DISCONTINUED] nebivolol (Bystolic) 5 MG tablet Take 1 tablet by mouth Daily.    [DISCONTINUED] Cholecalciferol 25 MCG (1000 UT) capsule Vitamin D3 1,000 unit oral capsule take 1 capsule by oral route daily   Active (Patient not taking: Reported on 3/26/2024)    [DISCONTINUED] oxybutynin XL (DITROPAN-XL) 5 MG 24 hr tablet Take 1 tablet by mouth Daily. (Patient not taking: Reported on 3/26/2024)     No current facility-administered medications on file prior to visit.       Objective   Vitals:     "03/26/24 1018   BP: 111/65   Pulse: 75   Weight: 83.6 kg (184 lb 3.2 oz)   Height: 165.1 cm (65\")       Physical Exam  Constitutional:       General: She is awake. She is not in acute distress.     Appearance: Normal appearance.   HENT:      Head: Normocephalic.      Nose: Nose normal. No congestion.   Eyes:      Extraocular Movements: Extraocular movements intact.      Conjunctiva/sclera: Conjunctivae normal.      Pupils: Pupils are equal, round, and reactive to light.   Neck:      Thyroid: No thyromegaly.      Vascular: No JVD.   Cardiovascular:      Rate and Rhythm: Normal rate and regular rhythm.      Chest Wall: PMI is not displaced.      Pulses: Normal pulses.      Heart sounds: Normal heart sounds, S1 normal and S2 normal. No murmur heard.     No friction rub. No gallop. No S3 or S4 sounds.   Pulmonary:      Effort: Pulmonary effort is normal.      Breath sounds: Normal breath sounds. No wheezing, rhonchi or rales.   Abdominal:      General: Bowel sounds are normal.      Palpations: Abdomen is soft.      Tenderness: There is no abdominal tenderness.   Musculoskeletal:      Cervical back: No tenderness.      Right lower leg: No edema.      Left lower leg: No edema.   Lymphadenopathy:      Cervical: No cervical adenopathy.   Skin:     General: Skin is warm and dry.      Capillary Refill: Capillary refill takes less than 2 seconds.      Coloration: Skin is not cyanotic.      Findings: No petechiae or rash.      Nails: There is no clubbing.   Neurological:      Mental Status: She is alert.   Psychiatric:         Mood and Affect: Mood normal.         Behavior: Behavior is cooperative.           Result Review     The following data was reviewed by KOLE Krause on 03/26/24.      CMP          1/16/2024    15:03   CMP   Glucose 96    BUN 11    Creatinine 0.87    EGFR 67.4    Sodium 139    Potassium 4.7    Chloride 101    Calcium 10.9    BUN/Creatinine Ratio 12.6    Anion Gap 12.0      CBC w/diff          " 1/16/2024    15:03   CBC w/Diff   WBC 8.29    RBC 4.60    Hemoglobin 14.6    Hematocrit 43.4    MCV 94.3    MCH 31.7    MCHC 33.6    RDW 12.4    Platelets 290           Results for orders placed during the hospital encounter of 03/18/24    Adult Transthoracic Echo Complete W/ Cont if Necessary Per Protocol    Interpretation Summary    Technically difficult study.    Left ventricular ejection fraction appears to be 66 - 70%.  Mildly hyperdynamic.    Left ventricular diastolic function is consistent with (grade I) impaired relaxation and age.    No significant valvular disease.        Notify patient the results of their Holter monitor were unremarkable. There were some occasional skipped beats noted but no sustained ectopy or concerning arrhythmias.        Procedures    Assessment & Plan  Diagnoses and all orders for this visit:    1. Primary hypertension (Primary)    2. Hyperlipidemia, unspecified hyperlipidemia type    3. Coronary artery disease involving native coronary artery of native heart without angina pectoris    4. Postural dizziness    Other orders  -     nebivolol (Bystolic) 2.5 MG tablet; Take 1 tablet by mouth Daily.  Dispense: 30 tablet; Refill: 3  -     pitavastatin calcium (Livalo) 2 MG tablet tablet; Take 1 tablet by mouth Every Night.  Dispense: 90 tablet; Refill: 3    1.  Well-controlled on current regimen.  Continue the same.  2.  Patient is on Livalo and cholesterol is well-controlled.  She is currently taking half of a 4 mg tablet.  This will be decreased to a 2 mg tablet.  3.  Patient notes no angina or anginal-like symptoms.  Continue baby aspirin daily.  4.  Patient notes significant improvement in her symptoms of dizziness since starting the Bystolic.  She does have some diarrhea that comes and goes since starting Bystolic.  She had some hyperdynamic LV systolic function on most recent echocardiogram so would prefer that she stay on this medication.  Dose will be decreased to 2.5 mg daily.   If she continues to have side effects can consider switching to bisoprolol.              The medical services provided during this encounter are part of ongoing care related to this patient's single serious condition or complex condition.    Follow Up   Return in about 6 months (around 9/26/2024) for With Dr. Cueva.    Patient was given instructions and counseling regarding her condition or for health maintenance advice. Please see specific information pulled into the AVS if appropriate.     Amanda Guy, KOLE  03/26/24  10:39 EDT    Dictated Utilizing Dragon Dictation

## 2024-04-22 ENCOUNTER — OFFICE VISIT (OUTPATIENT)
Dept: FAMILY MEDICINE CLINIC | Facility: CLINIC | Age: 79
End: 2024-04-22
Payer: MEDICARE

## 2024-04-22 ENCOUNTER — LAB (OUTPATIENT)
Dept: LAB | Facility: HOSPITAL | Age: 79
End: 2024-04-22
Payer: MEDICARE

## 2024-04-22 ENCOUNTER — HOSPITAL ENCOUNTER (OUTPATIENT)
Dept: GENERAL RADIOLOGY | Facility: HOSPITAL | Age: 79
Discharge: HOME | End: 2024-04-22
Payer: MEDICARE

## 2024-04-22 VITALS
BODY MASS INDEX: 29.76 KG/M2 | HEART RATE: 75 BPM | DIASTOLIC BLOOD PRESSURE: 68 MMHG | SYSTOLIC BLOOD PRESSURE: 118 MMHG | HEIGHT: 65 IN | OXYGEN SATURATION: 95 % | WEIGHT: 178.6 LBS

## 2024-04-22 DIAGNOSIS — K52.9 CHRONIC DIARRHEA: ICD-10-CM

## 2024-04-22 DIAGNOSIS — R68.89 HEAT INTOLERANCE: ICD-10-CM

## 2024-04-22 DIAGNOSIS — R11.0 NAUSEA: ICD-10-CM

## 2024-04-22 DIAGNOSIS — K52.9 CHRONIC DIARRHEA: Primary | ICD-10-CM

## 2024-04-22 LAB
CRP SERPL-MCNC: <0.3 MG/DL (ref 0–0.5)
DEPRECATED RDW RBC AUTO: 44.1 FL (ref 37–54)
ERYTHROCYTE [DISTWIDTH] IN BLOOD BY AUTOMATED COUNT: 12.7 % (ref 12.3–15.4)
ERYTHROCYTE [SEDIMENTATION RATE] IN BLOOD: 8 MM/HR (ref 0–30)
HCT VFR BLD AUTO: 42.2 % (ref 34–46.6)
HGB BLD-MCNC: 14 G/DL (ref 12–15.9)
LIPASE SERPL-CCNC: 38 U/L (ref 13–60)
MCH RBC QN AUTO: 31.5 PG (ref 26.6–33)
MCHC RBC AUTO-ENTMCNC: 33.2 G/DL (ref 31.5–35.7)
MCV RBC AUTO: 94.8 FL (ref 79–97)
PLATELET # BLD AUTO: 278 10*3/MM3 (ref 140–450)
PMV BLD AUTO: 11.1 FL (ref 6–12)
RBC # BLD AUTO: 4.45 10*6/MM3 (ref 3.77–5.28)
T4 FREE SERPL-MCNC: 1.57 NG/DL (ref 0.93–1.7)
TSH SERPL DL<=0.05 MIU/L-ACNC: 2.59 UIU/ML (ref 0.27–4.2)
WBC NRBC COR # BLD AUTO: 8.06 10*3/MM3 (ref 3.4–10.8)

## 2024-04-22 PROCEDURE — 85652 RBC SED RATE AUTOMATED: CPT

## 2024-04-22 PROCEDURE — 1160F RVW MEDS BY RX/DR IN RCRD: CPT | Performed by: STUDENT IN AN ORGANIZED HEALTH CARE EDUCATION/TRAINING PROGRAM

## 2024-04-22 PROCEDURE — 3078F DIAST BP <80 MM HG: CPT | Performed by: STUDENT IN AN ORGANIZED HEALTH CARE EDUCATION/TRAINING PROGRAM

## 2024-04-22 PROCEDURE — 3074F SYST BP LT 130 MM HG: CPT | Performed by: STUDENT IN AN ORGANIZED HEALTH CARE EDUCATION/TRAINING PROGRAM

## 2024-04-22 PROCEDURE — 86140 C-REACTIVE PROTEIN: CPT

## 2024-04-22 PROCEDURE — 36415 COLL VENOUS BLD VENIPUNCTURE: CPT

## 2024-04-22 PROCEDURE — 83690 ASSAY OF LIPASE: CPT

## 2024-04-22 PROCEDURE — 85027 COMPLETE CBC AUTOMATED: CPT

## 2024-04-22 PROCEDURE — 74018 RADEX ABDOMEN 1 VIEW: CPT

## 2024-04-22 PROCEDURE — 84443 ASSAY THYROID STIM HORMONE: CPT

## 2024-04-22 PROCEDURE — 84439 ASSAY OF FREE THYROXINE: CPT

## 2024-04-22 PROCEDURE — 86364 TISS TRNSGLTMNASE EA IG CLAS: CPT

## 2024-04-22 PROCEDURE — 1159F MED LIST DOCD IN RCRD: CPT | Performed by: STUDENT IN AN ORGANIZED HEALTH CARE EDUCATION/TRAINING PROGRAM

## 2024-04-22 PROCEDURE — 99213 OFFICE O/P EST LOW 20 MIN: CPT | Performed by: STUDENT IN AN ORGANIZED HEALTH CARE EDUCATION/TRAINING PROGRAM

## 2024-04-22 RX ORDER — LOPERAMIDE HYDROCHLORIDE 2 MG/1
2 TABLET ORAL 4 TIMES DAILY PRN
Qty: 90 TABLET | Refills: 0 | Status: SHIPPED | OUTPATIENT
Start: 2024-04-22

## 2024-04-22 RX ORDER — ONDANSETRON 4 MG/1
4 TABLET, FILM COATED ORAL EVERY 8 HOURS PRN
Qty: 14 TABLET | Refills: 0 | Status: SHIPPED | OUTPATIENT
Start: 2024-04-22

## 2024-04-22 NOTE — PROGRESS NOTES
Subjective:       Shreya Marino is a 78 y.o. female who presents to discuss diarrhea.     Ms. Marino is normally seen by my colleague, her PCP Manju Rodney. I am seeing her today for an acute sick visit.     Ms. Marino reports diarrhea.  She has watery, loose stools several times a day, sometimes waking her from sleep.    Symptoms began approximately 1 month ago.  Associated symptoms include nausea, rare and intermittent generalized abdominal pain, and bloating.  She denies fever or systemic signs of infection.  See review of systems for further details, including other pertinent positives and negatives.  Potential triggers include food, although she is not quite certain on this point.  She has used Pepto-Bismol to help treat her symptoms without improvement.    Ms. Marino denies history of known GI disease such as Crohn's disease or ulcerative colitis. She says she has had a clear colonoscopy in the recent past although I am not able to see a record of this in our system today.  She denies history of chronic diarrhea in the past.    She denies history of antibiotic use prior to onset of symptoms.    She does tell me that approximately 1 month ago, her medications were changed and she was started on nebivolol and pitavastatin.  She asks if medication adjustments could play a role in her current symptoms.    Vital signs are reassuring today. She is normotensive and heart rate is normal.  Physical exam is unremarkable.    The following portions of the patient's history were reviewed and updated as appropriate: allergies, current medications, past family history, past medical history, past social history, past surgical history, and problem list.    Past Medical Hx:  Past Medical History:   Diagnosis Date    Atherosclerosis of coronary artery 07/06/2016    with previous stent/PCI (June 2016);     Biceps tendinitis of right upper extremity 04/25/2018    CAD (coronary artery disease) 07/06/2016    Chest pain      CHF (congestive heart failure)     Heart attack     Heart burn     HLD (hyperlipidemia) 01/28/2021    Hypertension, essential 10/20/2021    Nasal fracture     Right rotator cuff tear 04/25/2018    Right shoulder pain     SLAP tear of shoulder 04/25/2018    Subacromial impingement of right shoulder 04/18/2018    Vitamin D deficiency 01/28/2021       Past Surgical Hx:  Past Surgical History:   Procedure Laterality Date    BREAST BIOPSY Left     CAROTID STENT      CATARACT EXTRACTION      CORONARY ANGIOPLASTY WITH STENT PLACEMENT      HYSTERECTOMY      NASAL SEPTUM SURGERY  07/10/2018    CLOSED REDUCTION, FRACTURE    RENAL ARTERY STENT      VEIN SURGERY         Current Meds:    Current Outpatient Medications:     aspirin 81 MG EC tablet, aspirin 81 mg oral tablet,delayed release (DR/EC) take 1 tablet (81 mg) by oral route once daily   Active, Disp: , Rfl:     BIOTIN PO, Take  by mouth., Disp: , Rfl:     Calcium Carbonate-Vit D-Min (CALTRATE 600+D PLUS PO), Take 1 tablet by mouth Daily., Disp: , Rfl:     cetirizine (zyrTEC) 10 MG tablet, Take 1 tablet by mouth Daily., Disp: , Rfl:     ezetimibe (ZETIA) 10 MG tablet, Take 1 tablet by mouth Daily., Disp: 90 tablet, Rfl: 3    multivitamin with minerals tablet tablet, Take 1 tablet by mouth Daily., Disp: , Rfl:     nebivolol (Bystolic) 2.5 MG tablet, Take 1 tablet by mouth Daily., Disp: 30 tablet, Rfl: 3    nitroglycerin (NITROSTAT) 0.4 MG SL tablet, 1 under the tongue as needed for angina, may repeat q5mins for up three doses, Disp: 25 tablet, Rfl: 2    pitavastatin calcium (Livalo) 2 MG tablet tablet, Take 1 tablet by mouth Every Night., Disp: 90 tablet, Rfl: 3    vitamin B-12 (CYANOCOBALAMIN) 1000 MCG tablet, Take 1 tablet by mouth Daily., Disp: , Rfl:     Diclofenac Sodium (VOLTAREN) 1 % gel gel, Apply 4 g topically to the appropriate area as directed 4 (Four) Times a Day As Needed (pain)., Disp: 100 g, Rfl: 1    famotidine (PEPCID) 20 MG tablet, Take 1 tablet by mouth  "2 (Two) Times a Day., Disp: , Rfl:     loperamide (Imodium A-D) 2 MG tablet, Take 1 tablet by mouth 4 (Four) Times a Day As Needed for Diarrhea., Disp: 90 tablet, Rfl: 0    ondansetron (Zofran) 4 MG tablet, Take 1 tablet by mouth Every 8 (Eight) Hours As Needed for Nausea or Vomiting., Disp: 14 tablet, Rfl: 0    Allergies:  No Known Allergies    Family Hx:  Family History   Problem Relation Age of Onset    Breast cancer Other     Heart disease Other     Cancer Other     Heart attack Mother         Death 2022        Social History:  Social History     Socioeconomic History    Marital status:    Tobacco Use    Smoking status: Never    Smokeless tobacco: Never   Vaping Use    Vaping status: Never Used   Substance and Sexual Activity    Alcohol use: Never    Drug use: Never    Sexual activity: Not Currently     Partners: Female     Birth control/protection: Condom       Review of Systems  Review of Systems   Constitutional:  Positive for chills and fatigue. Negative for activity change, appetite change, fever and unexpected weight change (no weight loss).   Gastrointestinal:  Positive for abdominal distention, abdominal pain (intermittent, last time was 2 weeks ago, epigastric, aching), diarrhea and nausea (sometimes prior to diarreah). Negative for blood in stool and vomiting.   Endocrine: Positive for heat intolerance.       Objective:     /68 (BP Location: Left arm, Patient Position: Sitting, Cuff Size: Adult)   Pulse 75   Ht 165.1 cm (65\")   Wt 81 kg (178 lb 9.6 oz)   SpO2 95%   BMI 29.72 kg/m²   Physical Exam  Neurological:      Mental Status: She is alert.          Assessment/Plan:     Diagnoses and all orders for this visit:    1. Chronic diarrhea (Primary)  2. Nausea    Ms. Marino meets criteria for chronic diarrhea based on the presence of watery stool of frequent duration lasting for greater than 4 weeks.     I would begin initial workup with CBC to assess for anemia, CMP to assess liver " function and electrolytes, CRP and ESR to assess for nonspecific markers of inflammation, lipase to look for inflammation of the pancreas, C. difficile testing, stool and ova parasite testing, tissue transglutaminase to assess for celiac disease, stool lactoferrin and/or calprotectin to assess for inflammation of the GI tract such as that seen in inflammatory bowel disease, TSH to rule out hypothyroidism, fecal fat to assess for malabsorption.  I would also order a KUB.    To treat her symptoms, I will use loperamide for diarrhea and Zofran for nausea.  I discussed use of these medications with Ms. Marino and reminded her that they are for symptomatic treatment but will not cure the underlying etiology of her diarrhea.    Because of the temporal relationship between the dose adjustment in her being started on nebivolol and pitavastatin, she asked me if it was possible that these medications could have contributed to the development of diarrhea.  It is true that nebivolol can cause nausea and diarrhea and up to 3% of patients and that pitavastatin has also been associated with diarrhea and up to 2.6% of patients and has had postmarketing reports with abdominal discomfort and nausea.  However, based on the duration of her symptoms, I want to rule out medical illness with the above testing.    Because of this, I will have her follow-up with her PCP in just 2 weeks to assess response to treatment today.  If the workup I am ordering is negative, at that visit they could discuss adjusting these medications to see if that helps with her symptoms.      -     loperamide (Imodium A-D) 2 MG tablet; Take 1 tablet by mouth 4 (Four) Times a Day As Needed for Diarrhea.  Dispense: 90 tablet; Refill: 0  -     CBC No Differential; Future  -     C-reactive protein; Future  -     Tissue Transglutaminase, IgG; Future  -     Calprotectin, Fecal - Stool, Per Rectum; Future  -     Fecal Lactoferrin Qual. - Stool, Per Rectum; Future  -      Clostridioides difficile Toxin, PCR - Stool, Per Rectum; Future  -     Giardia / Cryptosporidium Screen - Stool, Per Rectum; Future  -     XR Abdomen KUB; Future  -     Ova & Parasite Examination - Stool, Per Rectum; Future      -     ondansetron (Zofran) 4 MG tablet; Take 1 tablet by mouth Every 8 (Eight) Hours As Needed for Nausea or Vomiting.  Dispense: 14 tablet; Refill: 0  -     Sedimentation Rate; Future  -     Lipase; Future  -     Calprotectin, Fecal - Stool, Per Rectum; Future  -     Clostridioides difficile Toxin, PCR - Stool, Per Rectum; Future  -     Giardia / Cryptosporidium Screen - Stool, Per Rectum; Future  -     XR Abdomen KUB; Future  -     Ova & Parasite Examination - Stool, Per Rectum; Future    3. Heat intolerance  -     TSH+Free T4; Future          Rx changes: Loperamide and Zofran for as needed diarrhea and nausea, respectively    Follow-up:     Return in about 2 weeks (around 5/6/2024) for Chronic diarrhea.    Preventative:  Health Maintenance   Topic Date Due    TDAP/TD VACCINES (1 - Tdap) Never done    RSV Vaccine - Adults (1 - 1-dose 60+ series) Never done    LIPID PANEL  10/31/2023    ANNUAL WELLNESS VISIT  05/24/2024    ZOSTER VACCINE (1 of 2) 05/24/2024 (Originally [birth year]1993)    INFLUENZA VACCINE  08/01/2024    BMI FOLLOWUP  10/12/2024    DXA SCAN  06/23/2025    COVID-19 Vaccine  Completed    Pneumococcal Vaccine 65+  Completed         This document has been electronically signed by Nico Woods MD on April 22, 2024 14:00 EDT       Parts of this note are electronic transcriptions/translations of spoken language to printed text using the Dragon Dictation system.

## 2024-04-22 NOTE — PROGRESS NOTES
KUB does not show acute pathology. Can we notify her?    Thank you,    Nico Woods    ?  This document has been electronically signed by Nico Woods MD on April 22, 2024 18:39 EDT

## 2024-04-23 ENCOUNTER — LAB (OUTPATIENT)
Dept: LAB | Facility: HOSPITAL | Age: 79
End: 2024-04-23
Payer: MEDICARE

## 2024-04-23 DIAGNOSIS — R11.0 NAUSEA: ICD-10-CM

## 2024-04-23 DIAGNOSIS — K52.9 CHRONIC DIARRHEA: ICD-10-CM

## 2024-04-23 LAB
027 TOXIN: NORMAL
C DIFF TOX GENS STL QL NAA+PROBE: NEGATIVE
FATTY ACIDS: NORMAL
LACTOFERRIN STL QL LA: NEGATIVE
NEUTRAL FATS: NORMAL

## 2024-04-23 PROCEDURE — 83993 ASSAY FOR CALPROTECTIN FECAL: CPT

## 2024-04-23 PROCEDURE — 87493 C DIFF AMPLIFIED PROBE: CPT

## 2024-04-23 PROCEDURE — 87209 SMEAR COMPLEX STAIN: CPT

## 2024-04-23 PROCEDURE — 87328 CRYPTOSPORIDIUM AG IA: CPT

## 2024-04-23 PROCEDURE — 87177 OVA AND PARASITES SMEARS: CPT

## 2024-04-23 PROCEDURE — 87329 GIARDIA AG IA: CPT

## 2024-04-23 PROCEDURE — 89125 SPECIMEN FAT STAIN: CPT

## 2024-04-23 PROCEDURE — 83630 LACTOFERRIN FECAL (QUAL): CPT

## 2024-04-24 ENCOUNTER — TELEPHONE (OUTPATIENT)
Dept: FAMILY MEDICINE CLINIC | Facility: CLINIC | Age: 79
End: 2024-04-24

## 2024-04-24 LAB
CRYPTOSP AG STL QL IA: NEGATIVE
G LAMBLIA AG STL QL IA: NEGATIVE
TTG IGG SER-ACNC: <2 U/ML (ref 0–5)

## 2024-04-24 NOTE — TELEPHONE ENCOUNTER
Informed patient results are still pending and will get a call back when done. Patient did not want an appointment at this time.

## 2024-04-24 NOTE — TELEPHONE ENCOUNTER
Caller: Shreya Marino    Relationship: Self    Best call back number: [phone]     What test was performed: STOOL TESTING    When was the test performed: 4/23/24    Where was the test performed: [facility]    Additional notes: PATIENT DOES NOT FEEL WELL AND IS REQUESTING A CALL BACK ASAP.

## 2024-04-26 LAB
O+P SPEC MICRO: NORMAL
O+P STL CONC: NORMAL

## 2024-04-27 LAB — CALPROTECTIN STL-MCNT: 28 UG/G (ref 0–120)

## 2024-04-29 NOTE — PROGRESS NOTES
I have reviewed this patient's lab results to look for potential medical cause of chronic diarrhea.  Testing has been negative.  She has appointment with her PCP for follow-up for this on 5/6/2024.  Can we notify her of these negative lab results?    Thank you,    Nico Woods    ?  This document has been electronically signed by Nico Woods MD on April 29, 2024 09:15 EDT

## 2024-04-30 ENCOUNTER — TELEPHONE (OUTPATIENT)
Dept: CARDIOLOGY | Facility: CLINIC | Age: 79
End: 2024-04-30
Payer: MEDICARE

## 2024-04-30 NOTE — TELEPHONE ENCOUNTER
Pt left a vm, stating she needed a refill on Nebivol, 5 mgs.  On 1/16/2024, in Dr. Cueva's office note, it states pt was taking 5 mgs; however, at pt's most recent visit on 3/26/2024, medication was decreased to 2.5 mgs.       When I called to verify dose, pt stated that she has had extreme nauseating symptoms and diarrhea.  She reported going to her PCP and getting tests ran to find the cause.  Pt led me to believe she felt it was this medication that is the cause so I asked pt if she wanted me to refill it.  Pt stated she wanted our office to do the right thing.  She was pretty upset.       Please advise, thank you.

## 2024-04-30 NOTE — TELEPHONE ENCOUNTER
JENNY Agosto I spoke to pt and advised her that she could discontinue the Nebivolol, per Amanda.  She said she has been taking 5 mgs all along, and never switched to 2.5 mgs as instructed during the last office visit.  Pt said she never really had high bp.    Pt reiterated that she did not feel well.  She stated she heard from her PCP office and all tests came back fine.  And again, blamed her symptoms on the Nebivolol.    She stated that she has some swelling, SOA and dizziness.  Advised pt to call  Friday morning to let us know how she was doing and at that time, if she was not feeling better, our office could try to see her.      She voiced understanding.

## 2024-05-06 ENCOUNTER — OFFICE VISIT (OUTPATIENT)
Dept: FAMILY MEDICINE CLINIC | Facility: CLINIC | Age: 79
End: 2024-05-06
Payer: MEDICARE

## 2024-05-06 VITALS
OXYGEN SATURATION: 98 % | WEIGHT: 176 LBS | BODY MASS INDEX: 29.32 KG/M2 | HEART RATE: 80 BPM | DIASTOLIC BLOOD PRESSURE: 65 MMHG | HEIGHT: 65 IN | SYSTOLIC BLOOD PRESSURE: 95 MMHG

## 2024-05-06 DIAGNOSIS — Z12.31 ENCOUNTER FOR SCREENING MAMMOGRAM FOR MALIGNANT NEOPLASM OF BREAST: ICD-10-CM

## 2024-05-06 DIAGNOSIS — E78.5 HYPERLIPIDEMIA, UNSPECIFIED HYPERLIPIDEMIA TYPE: Primary | ICD-10-CM

## 2024-05-06 DIAGNOSIS — K21.9 GASTROESOPHAGEAL REFLUX DISEASE WITHOUT ESOPHAGITIS: ICD-10-CM

## 2024-05-06 DIAGNOSIS — E53.8 B12 DEFICIENCY: ICD-10-CM

## 2024-05-06 DIAGNOSIS — I10 PRIMARY HYPERTENSION: ICD-10-CM

## 2024-05-06 PROCEDURE — 1159F MED LIST DOCD IN RCRD: CPT | Performed by: NURSE PRACTITIONER

## 2024-05-06 PROCEDURE — 3074F SYST BP LT 130 MM HG: CPT | Performed by: NURSE PRACTITIONER

## 2024-05-06 PROCEDURE — 1160F RVW MEDS BY RX/DR IN RCRD: CPT | Performed by: NURSE PRACTITIONER

## 2024-05-06 PROCEDURE — 99214 OFFICE O/P EST MOD 30 MIN: CPT | Performed by: NURSE PRACTITIONER

## 2024-05-06 PROCEDURE — 3078F DIAST BP <80 MM HG: CPT | Performed by: NURSE PRACTITIONER

## 2024-05-09 ENCOUNTER — LAB (OUTPATIENT)
Dept: LAB | Facility: HOSPITAL | Age: 79
End: 2024-05-09
Payer: MEDICARE

## 2024-05-09 DIAGNOSIS — E78.5 HYPERLIPIDEMIA, UNSPECIFIED HYPERLIPIDEMIA TYPE: ICD-10-CM

## 2024-05-09 PROCEDURE — 80061 LIPID PANEL: CPT

## 2024-05-09 PROCEDURE — 80053 COMPREHEN METABOLIC PANEL: CPT

## 2024-05-09 PROCEDURE — 36415 COLL VENOUS BLD VENIPUNCTURE: CPT

## 2024-05-10 LAB
ALBUMIN SERPL-MCNC: 4.2 G/DL (ref 3.5–5.2)
ALBUMIN/GLOB SERPL: 1.7 G/DL
ALP SERPL-CCNC: 74 U/L (ref 39–117)
ALT SERPL W P-5'-P-CCNC: 24 U/L (ref 1–33)
ANION GAP SERPL CALCULATED.3IONS-SCNC: 11 MMOL/L (ref 5–15)
AST SERPL-CCNC: 26 U/L (ref 1–32)
BILIRUB SERPL-MCNC: 0.4 MG/DL (ref 0–1.2)
BUN SERPL-MCNC: 15 MG/DL (ref 8–23)
BUN/CREAT SERPL: 18.8 (ref 7–25)
CALCIUM SPEC-SCNC: 10.4 MG/DL (ref 8.6–10.5)
CHLORIDE SERPL-SCNC: 106 MMOL/L (ref 98–107)
CHOLEST SERPL-MCNC: 143 MG/DL (ref 0–200)
CO2 SERPL-SCNC: 24 MMOL/L (ref 22–29)
CREAT SERPL-MCNC: 0.8 MG/DL (ref 0.57–1)
EGFRCR SERPLBLD CKD-EPI 2021: 74.6 ML/MIN/1.73
GLOBULIN UR ELPH-MCNC: 2.5 GM/DL
GLUCOSE SERPL-MCNC: 95 MG/DL (ref 65–99)
HDLC SERPL-MCNC: 41 MG/DL (ref 40–60)
LDLC SERPL CALC-MCNC: 77 MG/DL (ref 0–100)
LDLC/HDLC SERPL: 1.79 {RATIO}
POTASSIUM SERPL-SCNC: 4.6 MMOL/L (ref 3.5–5.2)
PROT SERPL-MCNC: 6.7 G/DL (ref 6–8.5)
SODIUM SERPL-SCNC: 141 MMOL/L (ref 136–145)
TRIGL SERPL-MCNC: 144 MG/DL (ref 0–150)
VLDLC SERPL-MCNC: 25 MG/DL (ref 5–40)

## 2024-05-21 DIAGNOSIS — E78.5 HYPERLIPIDEMIA, UNSPECIFIED HYPERLIPIDEMIA TYPE: ICD-10-CM

## 2024-05-21 RX ORDER — EZETIMIBE 10 MG/1
10 TABLET ORAL DAILY
Qty: 90 TABLET | Refills: 1 | Status: SHIPPED | OUTPATIENT
Start: 2024-05-21

## 2024-05-28 ENCOUNTER — OFFICE VISIT (OUTPATIENT)
Dept: FAMILY MEDICINE CLINIC | Facility: CLINIC | Age: 79
End: 2024-05-28
Payer: MEDICARE

## 2024-05-28 VITALS
OXYGEN SATURATION: 96 % | HEIGHT: 65 IN | BODY MASS INDEX: 29.66 KG/M2 | SYSTOLIC BLOOD PRESSURE: 105 MMHG | DIASTOLIC BLOOD PRESSURE: 60 MMHG | HEART RATE: 88 BPM | WEIGHT: 178 LBS

## 2024-05-28 DIAGNOSIS — Z00.00 MEDICARE ANNUAL WELLNESS VISIT, SUBSEQUENT: Primary | ICD-10-CM

## 2024-05-28 PROCEDURE — 96160 PT-FOCUSED HLTH RISK ASSMT: CPT | Performed by: NURSE PRACTITIONER

## 2024-05-28 PROCEDURE — 1126F AMNT PAIN NOTED NONE PRSNT: CPT | Performed by: NURSE PRACTITIONER

## 2024-05-28 PROCEDURE — G0439 PPPS, SUBSEQ VISIT: HCPCS | Performed by: NURSE PRACTITIONER

## 2024-05-28 PROCEDURE — 3074F SYST BP LT 130 MM HG: CPT | Performed by: NURSE PRACTITIONER

## 2024-05-28 PROCEDURE — 1160F RVW MEDS BY RX/DR IN RCRD: CPT | Performed by: NURSE PRACTITIONER

## 2024-05-28 PROCEDURE — 1159F MED LIST DOCD IN RCRD: CPT | Performed by: NURSE PRACTITIONER

## 2024-05-28 PROCEDURE — 1170F FXNL STATUS ASSESSED: CPT | Performed by: NURSE PRACTITIONER

## 2024-05-28 PROCEDURE — 3078F DIAST BP <80 MM HG: CPT | Performed by: NURSE PRACTITIONER

## 2024-05-28 NOTE — PROGRESS NOTES
The ABCs of the Annual Wellness Visit  Subsequent Medicare Wellness Visit    Subjective      Shreya Marino is a 78 y.o. female who presents for a Subsequent Medicare Wellness Visit.    The following portions of the patient's history were reviewed and   updated as appropriate: allergies, current medications, past family history, past medical history, past social history, past surgical history, and problem list.    Compared to one year ago, the patient feels her physical   health is better.    Compared to one year ago, the patient feels her mental   health is better.    Recent Hospitalizations:  She was not admitted to the hospital during the last year.       Current Medical Providers:  Patient Care Team:  Manju Rodney APRN as PCP - General (Nurse Practitioner)    Outpatient Medications Prior to Visit   Medication Sig Dispense Refill    aspirin 81 MG EC tablet aspirin 81 mg oral tablet,delayed release (DR/EC) take 1 tablet (81 mg) by oral route once daily   Active      BIOTIN PO Take  by mouth.      Calcium Carbonate-Vit D-Min (CALTRATE 600+D PLUS PO) Take 1 tablet by mouth Daily.      cetirizine (zyrTEC) 10 MG tablet Take 1 tablet by mouth Daily.      Diclofenac Sodium (VOLTAREN) 1 % gel gel Apply 4 g topically to the appropriate area as directed 4 (Four) Times a Day As Needed (pain). 100 g 1    ezetimibe (ZETIA) 10 MG tablet TAKE ONE TABLET BY MOUTH DAILY 90 tablet 1    famotidine (PEPCID) 20 MG tablet Take 1 tablet by mouth 2 (Two) Times a Day.      multivitamin with minerals tablet tablet Take 1 tablet by mouth Daily.      nitroglycerin (NITROSTAT) 0.4 MG SL tablet 1 under the tongue as needed for angina, may repeat q5mins for up three doses 25 tablet 2    ondansetron (Zofran) 4 MG tablet Take 1 tablet by mouth Every 8 (Eight) Hours As Needed for Nausea or Vomiting. 14 tablet 0    pitavastatin calcium (Livalo) 2 MG tablet tablet Take 1 tablet by mouth Every Night. 90 tablet 3    vitamin B-12  "(CYANOCOBALAMIN) 1000 MCG tablet Take 1 tablet by mouth Daily.       No facility-administered medications prior to visit.       No opioid medication identified on active medication list. I have reviewed chart for other potential  high risk medication/s and harmful drug interactions in the elderly.        Aspirin is on active medication list. Aspirin use is indicated based on review of current medical condition/s. Pros and cons of this therapy have been discussed today. Benefits of this medication outweigh potential harm.  Patient has been encouraged to continue taking this medication.  .      Patient Active Problem List   Diagnosis    Atherosclerosis of coronary artery    Biceps tendinitis of right upper extremity    Heart attack    Heartburn    Hyperlipidemia    Nasal fracture    Right rotator cuff tear    Vitamin D deficiency    Hypertension, essential     Advance Care Planning   Advance Care Planning     Advance Directive is not on file.  ACP discussion was held with the patient during this visit. Patient does not have an advance directive, declines further assistance.     Objective    Vitals:    05/28/24 1435   BP: 105/60   Pulse: 88   SpO2: 96%   Weight: 80.7 kg (178 lb)   Height: 165.1 cm (65\")   PainSc: 0-No pain     Estimated body mass index is 29.62 kg/m² as calculated from the following:    Height as of this encounter: 165.1 cm (65\").    Weight as of this encounter: 80.7 kg (178 lb).           Does the patient have evidence of cognitive impairment?   No    Lab Results   Component Value Date    TRIG 144 05/09/2024    HDL 41 05/09/2024    LDL 77 05/09/2024    VLDL 25 05/09/2024          HEALTH RISK ASSESSMENT    Smoking Status:  Social History     Tobacco Use   Smoking Status Never   Smokeless Tobacco Never     Alcohol Consumption:  Social History     Substance and Sexual Activity   Alcohol Use Never     Fall Risk Screen:    STEADI Fall Risk Assessment was completed, and patient is at LOW risk for " falls.Assessment completed on:2024    Depression Screenin/28/2024     2:32 PM   PHQ-2/PHQ-9 Depression Screening   Little Interest or Pleasure in Doing Things 0-->not at all   Feeling Down, Depressed or Hopeless 0-->not at all   PHQ-9: Brief Depression Severity Measure Score 0       Health Habits and Functional and Cognitive Screenin/28/2024     2:32 PM   Functional & Cognitive Status   Do you have difficulty preparing food and eating? No   Do you have difficulty bathing yourself, getting dressed or grooming yourself? No   Do you have difficulty using the toilet? No   Do you have difficulty moving around from place to place? No   Do you have trouble with steps or getting out of a bed or a chair? No   Current Diet Well Balanced Diet   Dental Exam Up to date   Eye Exam Up to date   Exercise (times per week) 5 times per week   Current Exercises Include Yard Work;Walking   Do you need help using the phone?  No   Are you deaf or do you have serious difficulty hearing?  No   Do you need help to go to places out of walking distance? No   Do you need help shopping? No   Do you need help preparing meals?  No   Do you need help with housework?  No   Do you need help with laundry? No   Do you need help taking your medications? No   Do you need help managing money? No   Do you ever drive or ride in a car without wearing a seat belt? No   Have you felt unusual stress, anger or loneliness in the last month? No   Who do you live with? Spouse   If you need help, do you have trouble finding someone available to you? No   Have you been bothered in the last four weeks by sexual problems? No   Do you have difficulty concentrating, remembering or making decisions? No       Age-appropriate Screening Schedule:  Refer to the list below for future screening recommendations based on patient's age, sex and/or medical conditions. Orders for these recommended tests are listed in the plan section. The patient has been  provided with a written plan.    Health Maintenance   Topic Date Due    ZOSTER VACCINE (1 of 2) 05/28/2024 (Originally [birth year]31/1995)    COVID-19 Vaccine (7 - 2023-24 season) 08/17/2024 (Originally 3/13/2024)    RSV Vaccine - Adults (1 - 1-dose 60+ series) 05/28/2025 (Originally 5/31/2003)    TDAP/TD VACCINES (1 - Tdap) 05/28/2025 (Originally 5/31/1964)    INFLUENZA VACCINE  08/01/2024    BMI FOLLOWUP  10/12/2024    LIPID PANEL  05/09/2025    ANNUAL WELLNESS VISIT  05/28/2025    DXA SCAN  06/23/2025    Pneumococcal Vaccine 65+  Completed                  CMS Preventative Services Quick Reference  Risk Factors Identified During Encounter:    None Identified    The above risks/problems have been discussed with the patient.  Pertinent information has been shared with the patient in the After Visit Summary.    Diagnoses and all orders for this visit:    1. Medicare annual wellness visit, subsequent (Primary)        Follow Up:   Next Medicare Wellness visit to be scheduled in 1 year.      An After Visit Summary and PPPS were made available to the patient.

## 2024-06-27 ENCOUNTER — TELEPHONE (OUTPATIENT)
Dept: FAMILY MEDICINE CLINIC | Facility: CLINIC | Age: 79
End: 2024-06-27
Payer: MEDICARE

## 2024-06-27 ENCOUNTER — HOSPITAL ENCOUNTER (OUTPATIENT)
Dept: MAMMOGRAPHY | Facility: HOSPITAL | Age: 79
Discharge: HOME | End: 2024-06-27
Admitting: NURSE PRACTITIONER
Payer: MEDICARE

## 2024-06-27 DIAGNOSIS — Z12.31 ENCOUNTER FOR SCREENING MAMMOGRAM FOR MALIGNANT NEOPLASM OF BREAST: ICD-10-CM

## 2024-06-27 PROCEDURE — 77067 SCR MAMMO BI INCL CAD: CPT

## 2024-06-27 PROCEDURE — 77063 BREAST TOMOSYNTHESIS BI: CPT

## 2024-06-27 NOTE — TELEPHONE ENCOUNTER
[facility]UB PROVIDED THE RELAY MESSAGE FROM THE OFFICE   PATIENT VOICED UNDERSTANDING AND HAS NO FURTHER QUESTIONS AT THIS TIME          Ruthie Weiner MA         6/27/24  1:55 PM  Note      OK FOR [facility]UB TO RELAY Normal mammogram repeat in 1 year

## 2024-06-27 NOTE — TELEPHONE ENCOUNTER
----- Message from Manju Rodney sent at 6/27/2024  1:52 PM EDT -----  Normal mammogram repeat in 1 year

## 2024-08-28 ENCOUNTER — TELEPHONE (OUTPATIENT)
Dept: FAMILY MEDICINE CLINIC | Facility: CLINIC | Age: 79
End: 2024-08-28
Payer: MEDICARE

## 2024-08-28 NOTE — TELEPHONE ENCOUNTER
Caller: Shreya Marino    Relationship: Self    Best call back number: [phone]     What is the best time to reach you: ANY     Who are you requesting to speak with (clinical staff, provider,  specific staff member): CLINICAL       What was the call regarding: CALLER STATED THAT SHE IS NEEDING TO KNOW IF SHE IS TO BE TAKING THE MEDICATION EZETIMIBE OR IF IT IS A DISCONTINUED MEDICATION     Is it okay if the provider responds through MyChart: NO

## 2024-10-03 ENCOUNTER — OFFICE VISIT (OUTPATIENT)
Dept: FAMILY MEDICINE CLINIC | Facility: CLINIC | Age: 79
End: 2024-10-03
Payer: MEDICARE

## 2024-10-03 ENCOUNTER — HOSPITAL ENCOUNTER (OUTPATIENT)
Dept: GENERAL RADIOLOGY | Facility: HOSPITAL | Age: 79
Discharge: HOME | End: 2024-10-03
Payer: MEDICARE

## 2024-10-03 VITALS
SYSTOLIC BLOOD PRESSURE: 120 MMHG | DIASTOLIC BLOOD PRESSURE: 71 MMHG | HEIGHT: 65 IN | BODY MASS INDEX: 28.99 KG/M2 | WEIGHT: 174 LBS | HEART RATE: 82 BPM | OXYGEN SATURATION: 97 %

## 2024-10-03 DIAGNOSIS — M25.552 LEFT HIP PAIN: Primary | ICD-10-CM

## 2024-10-03 DIAGNOSIS — M25.552 LEFT HIP PAIN: ICD-10-CM

## 2024-10-03 PROCEDURE — 73502 X-RAY EXAM HIP UNI 2-3 VIEWS: CPT

## 2024-10-03 PROCEDURE — 3074F SYST BP LT 130 MM HG: CPT

## 2024-10-03 PROCEDURE — 99213 OFFICE O/P EST LOW 20 MIN: CPT

## 2024-10-03 PROCEDURE — 1126F AMNT PAIN NOTED NONE PRSNT: CPT

## 2024-10-03 PROCEDURE — 3078F DIAST BP <80 MM HG: CPT

## 2024-10-03 RX ORDER — SENNOSIDES 8.6 MG
1300 CAPSULE ORAL EVERY 8 HOURS PRN
Qty: 26 TABLET | Refills: 0 | Status: SHIPPED | OUTPATIENT
Start: 2024-10-03

## 2024-10-03 RX ORDER — TRAMADOL HYDROCHLORIDE 50 MG/1
50 TABLET ORAL EVERY 12 HOURS PRN
Qty: 8 TABLET | Refills: 0 | Status: CANCELLED | OUTPATIENT
Start: 2024-10-03

## 2024-10-03 RX ORDER — SENNOSIDES 8.6 MG
1300 CAPSULE ORAL EVERY 8 HOURS PRN
Qty: 13 TABLET | Refills: 0 | Status: SHIPPED | OUTPATIENT
Start: 2024-10-03 | End: 2024-10-03

## 2024-10-03 NOTE — PROGRESS NOTES
Chief Complaint   Patient presents with    Hip Pain     Pt c/o of pain in front of hip since Tuesday.           Chema Marino  has a past medical history of Atherosclerosis of coronary artery (07/06/2016), Biceps tendinitis of right upper extremity (04/25/2018), CAD (coronary artery disease) (07/06/2016), Chest pain, CHF (congestive heart failure), Heart attack, Heart burn, HLD (hyperlipidemia) (01/28/2021), Hypertension, essential (10/20/2021), Nasal fracture, Right rotator cuff tear (04/25/2018), Right shoulder pain, SLAP tear of shoulder (04/25/2018), Subacromial impingement of right shoulder (04/18/2018), and Vitamin D deficiency (01/28/2021).    ABEBA Cash is an 79 year old female patient of my colleague Manju Rodney. Today I am seeing her for an acute visit.     Today she presents with anterior left hip pain since last Tuesday she believes it is due to raking the leaves outside. Has been taking Tylenol with enough relief to help her sleep. No falls noted and can bear weight. Has been taking care of her  and has been doing yard work. No prior imaging on her hips. Rest and tylenol make it better. Sitting and standing make it worse. Reports she can't be on any Iburprofen due to her stents and heart attack per cardiology. Reports no steroids as she doesn't like how it makes her feel. States her pain is an 8-9/10 at its worst and described as sharp and stabbing when sitting a certain way or walking Does have a slight limp to her walk. We will do an XR of her left hip and will give her an increased dose of Tylenol to hopefully help with the pain. Reviewed LFTs and uptodate recommendations for dosing in the elderly. Instructed if not improved to follow up.     No Known Allergies      Current Outpatient Medications:     aspirin 81 MG EC tablet, aspirin 81 mg oral tablet,delayed release (DR/EC) take 1 tablet (81 mg) by oral route once daily   Active, Disp: , Rfl:     BIOTIN PO, Take  by  mouth., Disp: , Rfl:     Calcium Carbonate-Vit D-Min (CALTRATE 600+D PLUS PO), Take 1 tablet by mouth Daily., Disp: , Rfl:     cetirizine (zyrTEC) 10 MG tablet, Take 1 tablet by mouth Daily., Disp: , Rfl:     Diclofenac Sodium (VOLTAREN) 1 % gel gel, Apply 4 g topically to the appropriate area as directed 4 (Four) Times a Day As Needed (pain)., Disp: 100 g, Rfl: 1    ezetimibe (ZETIA) 10 MG tablet, TAKE ONE TABLET BY MOUTH DAILY, Disp: 90 tablet, Rfl: 1    famotidine (PEPCID) 20 MG tablet, Take 1 tablet by mouth 2 (Two) Times a Day., Disp: , Rfl:     multivitamin with minerals tablet tablet, Take 1 tablet by mouth Daily., Disp: , Rfl:     nitroglycerin (NITROSTAT) 0.4 MG SL tablet, 1 under the tongue as needed for angina, may repeat q5mins for up three doses, Disp: 25 tablet, Rfl: 2    ondansetron (Zofran) 4 MG tablet, Take 1 tablet by mouth Every 8 (Eight) Hours As Needed for Nausea or Vomiting., Disp: 14 tablet, Rfl: 0    pitavastatin calcium (Livalo) 2 MG tablet tablet, Take 1 tablet by mouth Every Night., Disp: 90 tablet, Rfl: 3    vitamin B-12 (CYANOCOBALAMIN) 1000 MCG tablet, Take 1 tablet by mouth Daily., Disp: , Rfl:     acetaminophen (Tylenol 8 Hour) 650 MG 8 hr tablet, Take 2 tablets by mouth Every 8 (Eight) Hours As Needed for Moderate Pain., Disp: 13 tablet, Rfl: 0    Patient Active Problem List   Diagnosis    Atherosclerosis of coronary artery    Biceps tendinitis of right upper extremity    Heart attack    Heartburn    Hyperlipidemia    Nasal fracture    Right rotator cuff tear    Vitamin D deficiency    Hypertension, essential        Past Surgical History:   Procedure Laterality Date    BREAST BIOPSY Left     CAROTID STENT      CATARACT EXTRACTION      CORONARY ANGIOPLASTY WITH STENT PLACEMENT      HYSTERECTOMY      NASAL SEPTUM SURGERY  07/10/2018    CLOSED REDUCTION, FRACTURE    RENAL ARTERY STENT      VEIN SURGERY         Social History     Socioeconomic History    Marital status:    Tobacco  "Use    Smoking status: Never    Smokeless tobacco: Never   Vaping Use    Vaping status: Never Used   Substance and Sexual Activity    Alcohol use: Never    Drug use: Never    Sexual activity: Not Currently     Partners: Female     Birth control/protection: Condom       Family History   Problem Relation Age of Onset    Breast cancer Other     Heart disease Other     Cancer Other     Heart attack Mother         Death 2022       Family history, surgical history, past medical history, Allergies and med's reviewed with patient today and updated in [facility] EMR.     ROS:  Review of Systems   Respiratory: Negative.     Cardiovascular: Negative.    Gastrointestinal:  Negative for abdominal pain, constipation and diarrhea.   Musculoskeletal:  Positive for arthralgias and myalgias.       OBJECTIVE:  Vitals:    10/03/24 1350 10/03/24 1356   BP:  120/71   Pulse:  82   SpO2:  97%   Weight:  78.9 kg (174 lb)   Height: 165.1 cm (65\") 165.1 cm (65\")     Body mass index is 28.96 kg/m².  No LMP recorded.    Physical Exam  Cardiovascular:      Rate and Rhythm: Normal rate and regular rhythm.      Pulses: Normal pulses.      Heart sounds: Normal heart sounds.   Musculoskeletal:      Right hip: No tenderness.      Left hip: Tenderness present. No bony tenderness. Normal range of motion. Normal strength.        Legs:       Comments: Tender to palpation                Health Maintenance Due   Topic Date Due    ZOSTER VACCINE (1 of 2) Never done    INFLUENZA VACCINE  08/01/2024        No visits with results within 30 Day(s) from this visit.   Latest known visit with results is:   Lab on 05/09/2024   Component Date Value Ref Range Status    Glucose 05/09/2024 95  65 - 99 mg/dL Final    BUN 05/09/2024 15  8 - 23 mg/dL Final    Creatinine 05/09/2024 0.80  0.57 - 1.00 mg/dL Final    Sodium 05/09/2024 141  136 - 145 mmol/L Final    Potassium 05/09/2024 4.6  3.5 - 5.2 mmol/L Final    Chloride 05/09/2024 106  98 - 107 mmol/L Final    CO2 05/09/2024 " 24.0  22.0 - 29.0 mmol/L Final    Calcium 05/09/2024 10.4  8.6 - 10.5 mg/dL Final    Total Protein 05/09/2024 6.7  6.0 - 8.5 g/dL Final    Albumin 05/09/2024 4.2  3.5 - 5.2 g/dL Final    ALT (SGPT) 05/09/2024 24  1 - 33 U/L Final    AST (SGOT) 05/09/2024 26  1 - 32 U/L Final    Alkaline Phosphatase 05/09/2024 74  39 - 117 U/L Final    Total Bilirubin 05/09/2024 0.4  0.0 - 1.2 mg/dL Final    Globulin 05/09/2024 2.5  gm/dL Final    A/G Ratio 05/09/2024 1.7  g/dL Final    BUN/Creatinine Ratio 05/09/2024 18.8  7.0 - 25.0 Final    Anion Gap 05/09/2024 11.0  5.0 - 15.0 mmol/L Final    eGFR 05/09/2024 74.6  >60.0 mL/min/1.73 Final    Total Cholesterol 05/09/2024 143  0 - 200 mg/dL Final    Triglycerides 05/09/2024 144  0 - 150 mg/dL Final    HDL Cholesterol 05/09/2024 41  40 - 60 mg/dL Final    LDL Cholesterol  05/09/2024 77  0 - 100 mg/dL Final    VLDL Cholesterol 05/09/2024 25  5 - 40 mg/dL Final    LDL/HDL Ratio 05/09/2024 1.79   Final       ASSESSMENT/ PLAN:    Diagnoses and all orders for this visit:    1. Left hip pain (Primary)  -     XR Hip With or Without Pelvis 2 - 3 View Left; Future  -     acetaminophen (Tylenol 8 Hour) 650 MG 8 hr tablet; Take 2 tablets by mouth Every 8 (Eight) Hours As Needed for Moderate Pain.  Dispense: 13 tablet; Refill: 0        Orders Placed Today:     New Medications Ordered This Visit   Medications    acetaminophen (Tylenol 8 Hour) 650 MG 8 hr tablet     Sig: Take 2 tablets by mouth Every 8 (Eight) Hours As Needed for Moderate Pain.     Dispense:  13 tablet     Refill:  0        Management Plan:     An After Visit Summary was printed and given to the patient at discharge.    Follow-up: Return if symptoms worsen or fail to improve.    KOLE Kwon 10/3/2024 14:48 EDT  This note was electronically signed.

## 2024-10-04 ENCOUNTER — TELEPHONE (OUTPATIENT)
Dept: FAMILY MEDICINE CLINIC | Facility: CLINIC | Age: 79
End: 2024-10-04
Payer: MEDICARE

## 2024-10-04 NOTE — TELEPHONE ENCOUNTER
Pt called requesting appt due to pain on left hip closer to front stomach and feels a knot. Offered pt appt on 10.7 at 0800 with Natalie and pt declines.

## 2024-10-04 NOTE — TELEPHONE ENCOUNTER
Patient states that the X-ray didn't show anything, but she is wondering if she could get an CT done due to her still being in pain.

## 2024-10-07 ENCOUNTER — OFFICE VISIT (OUTPATIENT)
Dept: FAMILY MEDICINE CLINIC | Facility: CLINIC | Age: 79
End: 2024-10-07
Payer: MEDICARE

## 2024-10-07 VITALS
WEIGHT: 172.2 LBS | SYSTOLIC BLOOD PRESSURE: 122 MMHG | OXYGEN SATURATION: 94 % | DIASTOLIC BLOOD PRESSURE: 69 MMHG | BODY MASS INDEX: 28.69 KG/M2 | HEIGHT: 65 IN | HEART RATE: 111 BPM

## 2024-10-07 DIAGNOSIS — M25.552 PAIN OF LEFT HIP: Primary | ICD-10-CM

## 2024-10-07 PROCEDURE — 1125F AMNT PAIN NOTED PAIN PRSNT: CPT

## 2024-10-07 PROCEDURE — 1160F RVW MEDS BY RX/DR IN RCRD: CPT

## 2024-10-07 PROCEDURE — 3074F SYST BP LT 130 MM HG: CPT

## 2024-10-07 PROCEDURE — 3078F DIAST BP <80 MM HG: CPT

## 2024-10-07 PROCEDURE — 1159F MED LIST DOCD IN RCRD: CPT

## 2024-10-07 PROCEDURE — 99213 OFFICE O/P EST LOW 20 MIN: CPT

## 2024-10-07 NOTE — PROGRESS NOTES
Chief Complaint   Patient presents with    Hip Pain        Subjective     Shreya Marino  has a past medical history of Atherosclerosis of coronary artery (07/06/2016), Biceps tendinitis of right upper extremity (04/25/2018), CAD (coronary artery disease) (07/06/2016), Chest pain, CHF (congestive heart failure), Heart attack, Heart burn, HLD (hyperlipidemia) (01/28/2021), Hypertension, essential (10/20/2021), Nasal fracture, Right rotator cuff tear (04/25/2018), Right shoulder pain, SLAP tear of shoulder (04/25/2018), Subacromial impingement of right shoulder (04/18/2018), and Vitamin D deficiency (01/28/2021).    HPI    Patient having persistnet hip pain. Previously seen on 10/3/24 XR of hip came back insignificant. States that the pain is slightly above her hip not on the bone itself. States she felt a pop when raking leaves about 2 weeks ago. Pain 8/9 out of 10 and claims it's there all the time and it feels like she has run a race and the pain is down in there. Minimal relief with Tylenol. Not able to get Toradol shot due to cardiac history and 2 stents. No bowel or bladder changes. Gait is normal. Will order MRI. Instructed if no relief and nothing on MRI then may need to refer to PT or ortho.     No Known Allergies      Current Outpatient Medications:     acetaminophen (Tylenol 8 Hour) 650 MG 8 hr tablet, Take 2 tablets by mouth Every 8 (Eight) Hours As Needed for Moderate Pain., Disp: 26 tablet, Rfl: 0    aspirin 81 MG EC tablet, aspirin 81 mg oral tablet,delayed release (DR/EC) take 1 tablet (81 mg) by oral route once daily   Active, Disp: , Rfl:     BIOTIN PO, Take  by mouth., Disp: , Rfl:     Calcium Carbonate-Vit D-Min (CALTRATE 600+D PLUS PO), Take 1 tablet by mouth Daily., Disp: , Rfl:     cetirizine (zyrTEC) 10 MG tablet, Take 1 tablet by mouth Daily., Disp: , Rfl:     Diclofenac Sodium (VOLTAREN) 1 % gel gel, Apply 4 g topically to the appropriate area as directed 4 (Four) Times a Day As Needed  (pain)., Disp: 100 g, Rfl: 1    ezetimibe (ZETIA) 10 MG tablet, TAKE ONE TABLET BY MOUTH DAILY, Disp: 90 tablet, Rfl: 1    famotidine (PEPCID) 20 MG tablet, Take 1 tablet by mouth 2 (Two) Times a Day., Disp: , Rfl:     multivitamin with minerals tablet tablet, Take 1 tablet by mouth Daily., Disp: , Rfl:     nitroglycerin (NITROSTAT) 0.4 MG SL tablet, 1 under the tongue as needed for angina, may repeat q5mins for up three doses, Disp: 25 tablet, Rfl: 2    pitavastatin calcium (Livalo) 2 MG tablet tablet, Take 1 tablet by mouth Every Night., Disp: 90 tablet, Rfl: 3    ondansetron (Zofran) 4 MG tablet, Take 1 tablet by mouth Every 8 (Eight) Hours As Needed for Nausea or Vomiting. (Patient not taking: Reported on 10/7/2024), Disp: 14 tablet, Rfl: 0    vitamin B-12 (CYANOCOBALAMIN) 1000 MCG tablet, Take 1 tablet by mouth Daily., Disp: , Rfl:     Patient Active Problem List   Diagnosis    Atherosclerosis of coronary artery    Biceps tendinitis of right upper extremity    Heart attack    Heartburn    Hyperlipidemia    Nasal fracture    Right rotator cuff tear    Vitamin D deficiency    Hypertension, essential        Past Surgical History:   Procedure Laterality Date    BREAST BIOPSY Left     CAROTID STENT      CATARACT EXTRACTION      CORONARY ANGIOPLASTY WITH STENT PLACEMENT      HYSTERECTOMY      NASAL SEPTUM SURGERY  07/10/2018    CLOSED REDUCTION, FRACTURE    RENAL ARTERY STENT      VEIN SURGERY         Social History     Socioeconomic History    Marital status:    Tobacco Use    Smoking status: Never    Smokeless tobacco: Never   Vaping Use    Vaping status: Never Used   Substance and Sexual Activity    Alcohol use: Never    Drug use: Never    Sexual activity: Not Currently     Partners: Female     Birth control/protection: Condom       Family History   Problem Relation Age of Onset    Breast cancer Other     Heart disease Other     Cancer Other     Heart attack Mother         Death 2022       Family history,  "surgical history, past medical history, Allergies and med's reviewed with patient today and updated in [facility] EMR.     ROS:  Review of Systems   Respiratory: Negative.     Cardiovascular: Negative.    Gastrointestinal: Negative.    Musculoskeletal:  Positive for arthralgias and myalgias.       OBJECTIVE:  Vitals:    10/07/24 1103   BP: 122/69   Pulse: 111   SpO2: 94%   Weight: 78.1 kg (172 lb 3.2 oz)   Height: 165.1 cm (65\")     Body mass index is 28.66 kg/m².  No LMP recorded.    Physical Exam  Cardiovascular:      Rate and Rhythm: Normal rate and regular rhythm.      Pulses: Normal pulses.      Heart sounds: Normal heart sounds.   Pulmonary:      Effort: Pulmonary effort is normal.      Breath sounds: Normal breath sounds.   Abdominal:      General: Abdomen is flat. Bowel sounds are normal.      Palpations: Abdomen is soft.      Tenderness: There is abdominal tenderness. Negative signs include Mcintyre's sign, Rovsing's sign, McBurney's sign, psoas sign and obturator sign.       Musculoskeletal:      Right hip: No tenderness or bony tenderness.      Left hip: Tenderness present. No bony tenderness. Decreased range of motion. Normal strength.                Health Maintenance Due   Topic Date Due    ZOSTER VACCINE (1 of 2) Never done    INFLUENZA VACCINE  08/01/2024    COVID-19 Vaccine (7 - 2023-24 season) 09/01/2024        No visits with results within 30 Day(s) from this visit.   Latest known visit with results is:   Lab on 05/09/2024   Component Date Value Ref Range Status    Glucose 05/09/2024 95  65 - 99 mg/dL Final    BUN 05/09/2024 15  8 - 23 mg/dL Final    Creatinine 05/09/2024 0.80  0.57 - 1.00 mg/dL Final    Sodium 05/09/2024 141  136 - 145 mmol/L Final    Potassium 05/09/2024 4.6  3.5 - 5.2 mmol/L Final    Chloride 05/09/2024 106  98 - 107 mmol/L Final    CO2 05/09/2024 24.0  22.0 - 29.0 mmol/L Final    Calcium 05/09/2024 10.4  8.6 - 10.5 mg/dL Final    Total Protein 05/09/2024 6.7  6.0 - 8.5 g/dL Final    " Albumin 05/09/2024 4.2  3.5 - 5.2 g/dL Final    ALT (SGPT) 05/09/2024 24  1 - 33 U/L Final    AST (SGOT) 05/09/2024 26  1 - 32 U/L Final    Alkaline Phosphatase 05/09/2024 74  39 - 117 U/L Final    Total Bilirubin 05/09/2024 0.4  0.0 - 1.2 mg/dL Final    Globulin 05/09/2024 2.5  gm/dL Final    A/G Ratio 05/09/2024 1.7  g/dL Final    BUN/Creatinine Ratio 05/09/2024 18.8  7.0 - 25.0 Final    Anion Gap 05/09/2024 11.0  5.0 - 15.0 mmol/L Final    eGFR 05/09/2024 74.6  >60.0 mL/min/1.73 Final    Total Cholesterol 05/09/2024 143  0 - 200 mg/dL Final    Triglycerides 05/09/2024 144  0 - 150 mg/dL Final    HDL Cholesterol 05/09/2024 41  40 - 60 mg/dL Final    LDL Cholesterol  05/09/2024 77  0 - 100 mg/dL Final    VLDL Cholesterol 05/09/2024 25  5 - 40 mg/dL Final    LDL/HDL Ratio 05/09/2024 1.79   Final       ASSESSMENT/ PLAN:    Diagnoses and all orders for this visit:    1. Pain of left hip (Primary)  -     MRI Pelvis With & Without Contrast; Future        Orders Placed Today:     No orders of the defined types were placed in this encounter.       Management Plan:     An After Visit Summary was printed and given to the patient at discharge.    Follow-up: Return if symptoms worsen or fail to improve.    KOLE Kwon 10/7/2024 11:31 EDT  This note was electronically signed.

## 2024-10-09 ENCOUNTER — HOSPITAL ENCOUNTER (OUTPATIENT)
Dept: MRI IMAGING | Facility: HOSPITAL | Age: 79
Discharge: HOME | End: 2024-10-09
Payer: MEDICARE

## 2024-10-09 DIAGNOSIS — M25.552 PAIN OF LEFT HIP: ICD-10-CM

## 2024-10-09 LAB
CREAT BLDA-MCNC: 0.8 MG/DL (ref 0.6–1.3)
EGFRCR SERPLBLD CKD-EPI 2021: 74.1 ML/MIN/1.73

## 2024-10-09 PROCEDURE — 72195 MRI PELVIS W/O DYE: CPT

## 2024-10-09 PROCEDURE — 82565 ASSAY OF CREATININE: CPT

## 2024-10-10 DIAGNOSIS — K43.9 SPIGELIAN HERNIA: Primary | ICD-10-CM

## 2024-10-17 ENCOUNTER — OFFICE VISIT (OUTPATIENT)
Dept: CARDIOLOGY | Facility: CLINIC | Age: 79
End: 2024-10-17
Payer: MEDICARE

## 2024-10-17 VITALS
SYSTOLIC BLOOD PRESSURE: 137 MMHG | HEART RATE: 69 BPM | DIASTOLIC BLOOD PRESSURE: 75 MMHG | WEIGHT: 175.8 LBS | BODY MASS INDEX: 29.29 KG/M2 | HEIGHT: 65 IN

## 2024-10-17 DIAGNOSIS — I10 PRIMARY HYPERTENSION: ICD-10-CM

## 2024-10-17 DIAGNOSIS — I25.10 CORONARY ARTERY DISEASE INVOLVING NATIVE CORONARY ARTERY OF NATIVE HEART WITHOUT ANGINA PECTORIS: Primary | ICD-10-CM

## 2024-10-17 DIAGNOSIS — E78.5 HYPERLIPIDEMIA, UNSPECIFIED HYPERLIPIDEMIA TYPE: ICD-10-CM

## 2024-10-17 NOTE — PROGRESS NOTES
Chief Complaint  Hypertension and Hyperlipidemia (6 month f/u)    Subjective            Shreya Marino presents to [facility] CARDIOLOGY  History of Present Illness    Shreya is here for follow-up evaluation management of coronary artery disease, essential hypertension, hyperlipidemia.  Since her last visit she has stopped Bystolic due to reporting nausea and diarrhea that she attributed to the Bystolic.  However she is having those symptoms still but does not wish to resume.  This was started due to hyperdynamic LVEF.  She has no cardiac complaints today.  She denies chest pain or shortness of breath.  She reports a hernia for which she is seeing general surgery for next week.  She is having some pain in that area but otherwise no complaints.    PMH  Past Medical History:   Diagnosis Date    Atherosclerosis of coronary artery 07/06/2016    with previous stent/PCI (June 2016);     Biceps tendinitis of right upper extremity 04/25/2018    CAD (coronary artery disease) 07/06/2016    Chest pain     CHF (congestive heart failure)     Heart attack     Heart burn     HLD (hyperlipidemia) 01/28/2021    Hypertension, essential 10/20/2021    Nasal fracture     Right rotator cuff tear 04/25/2018    Right shoulder pain     SLAP tear of shoulder 04/25/2018    Subacromial impingement of right shoulder 04/18/2018    Vitamin D deficiency 01/28/2021         SURGICALHX  Past Surgical History:   Procedure Laterality Date    BREAST BIOPSY Left     CAROTID STENT      CATARACT EXTRACTION      CORONARY ANGIOPLASTY WITH STENT PLACEMENT      HYSTERECTOMY      NASAL SEPTUM SURGERY  07/10/2018    CLOSED REDUCTION, FRACTURE    RENAL ARTERY STENT      VEIN SURGERY          SOC  Social History     Socioeconomic History    Marital status:    Tobacco Use    Smoking status: Never    Smokeless tobacco: Never    Tobacco comments:     Father smoked, i never smoked   Vaping Use    Vaping status: Never Used   Substance and  Sexual Activity    Alcohol use: Never    Drug use: Never    Sexual activity: Not Currently     Partners: Female     Birth control/protection: Condom, Post-menopausal         FAMHX  Family History   Problem Relation Age of Onset    Breast cancer Other     Heart disease Other     Cancer Other     Heart attack Mother         Death 2022          ALLERGY  No Known Allergies     MEDSCURRENT    Current Outpatient Medications:     acetaminophen (Tylenol 8 Hour) 650 MG 8 hr tablet, Take 2 tablets by mouth Every 8 (Eight) Hours As Needed for Moderate Pain., Disp: 26 tablet, Rfl: 0    aspirin 81 MG EC tablet, aspirin 81 mg oral tablet,delayed release (DR/EC) take 1 tablet (81 mg) by oral route once daily   Active, Disp: , Rfl:     BIOTIN PO, Take  by mouth., Disp: , Rfl:     Calcium Carbonate-Vit D-Min (CALTRATE 600+D PLUS PO), Take 1 tablet by mouth Daily., Disp: , Rfl:     cetirizine (zyrTEC) 10 MG tablet, Take 1 tablet by mouth Daily., Disp: , Rfl:     Diclofenac Sodium (VOLTAREN) 1 % gel gel, Apply 4 g topically to the appropriate area as directed 4 (Four) Times a Day As Needed (pain)., Disp: 100 g, Rfl: 1    ezetimibe (ZETIA) 10 MG tablet, TAKE ONE TABLET BY MOUTH DAILY, Disp: 90 tablet, Rfl: 1    famotidine (PEPCID) 20 MG tablet, Take 1 tablet by mouth 2 (Two) Times a Day., Disp: , Rfl:     multivitamin with minerals tablet tablet, Take 1 tablet by mouth Daily., Disp: , Rfl:     nitroglycerin (NITROSTAT) 0.4 MG SL tablet, 1 under the tongue as needed for angina, may repeat q5mins for up three doses, Disp: 25 tablet, Rfl: 2    pitavastatin calcium (Livalo) 2 MG tablet tablet, Take 1 tablet by mouth Every Night., Disp: 90 tablet, Rfl: 3    vitamin B-12 (CYANOCOBALAMIN) 1000 MCG tablet, Take 1 tablet by mouth Daily., Disp: , Rfl:     ondansetron (Zofran) 4 MG tablet, Take 1 tablet by mouth Every 8 (Eight) Hours As Needed for Nausea or Vomiting. (Patient not taking: Reported on 10/17/2024), Disp: 14 tablet, Rfl:  "0      Review of Systems   Cardiovascular:  Negative for chest pain, dyspnea on exertion, palpitations and syncope.        Objective     /75   Pulse 69   Ht 165.1 cm (65\")   Wt 79.7 kg (175 lb 12.8 oz)   BMI 29.25 kg/m²       General Appearance:   well developed  well nourished  HENT:   oropharynx moist  lips not cyanotic  Neck:  thyroid not enlarged  supple  Respiratory:  no respiratory distress  normal breath sounds  no rales  Cardiovascular:  no jugular venous distention  regular rhythm  apical impulse normal  S1 normal, S2 normal  no S3, no S4   no murmur  no rub, no thrill  carotid pulses normal; no bruit  pedal pulses normal  lower extremity edema: none    Musculoskeletal:  no clubbing of fingers.   normocephalic, head atraumatic  Skin:   warm, dry  Psychiatric:  judgement and insight appropriate  normal mood and affect      Result Review :     The following data was reviewed by: KOLE Lamar on 10/17/2024:    CMP          1/16/2024    15:03 5/9/2024    11:09 10/9/2024    12:35   CMP   Glucose 96  95     BUN 11  15     Creatinine 0.87  0.80  0.80    EGFR 67.4  74.6  74.1    Sodium 139  141     Potassium 4.7  4.6     Chloride 101  106     Calcium 10.9  10.4     Total Protein  6.7     Albumin  4.2     Globulin  2.5     Total Bilirubin  0.4     Alkaline Phosphatase  74     AST (SGOT)  26     ALT (SGPT)  24     Albumin/Globulin Ratio  1.7     BUN/Creatinine Ratio 12.6  18.8     Anion Gap 12.0  11.0       CBC          1/16/2024    15:03 4/22/2024    14:11   CBC   WBC 8.29  8.06    RBC 4.60  4.45    Hemoglobin 14.6  14.0    Hematocrit 43.4  42.2    MCV 94.3  94.8    MCH 31.7  31.5    MCHC 33.6  33.2    RDW 12.4  12.7    Platelets 290  278      Lipid Panel          5/9/2024    11:09   Lipid Panel   Total Cholesterol 143    Triglycerides 144    HDL Cholesterol 41    VLDL Cholesterol 25    LDL Cholesterol  77    LDL/HDL Ratio 1.79      TSH          1/16/2024    15:03 4/22/2024    14:11   TSH "   TSH 2.900  2.590             Procedures      Shreya Marino  reports that she has never smoked. She has never used smokeless tobacco. I have educated her on the risk of diseases from using tobacco products such as cancer, COPD, and heart disease.              Assessment and Plan        ASSESSMENT:  Encounter Diagnoses   Name Primary?    Coronary artery disease involving native coronary artery of native heart without angina pectoris Yes    Primary hypertension     Hyperlipidemia, unspecified hyperlipidemia type          PLAN:    1.  Coronary artery disease, clinically stable without angina.  Continue current medical therapy.  2.  Her blood pressure is controlled.  3.  Lipids are well-controlled on Livalo and Zetia, continue.    Follow-up in 6 months.      Patient was given instructions and counseling regarding her condition or for health maintenance advice. Please see specific information pulled into the AVS if appropriate.           Alondra Person, APRN   10/17/2024  12:57 EDT

## 2024-10-21 ENCOUNTER — OFFICE VISIT (OUTPATIENT)
Dept: SURGERY | Facility: CLINIC | Age: 79
End: 2024-10-21
Payer: MEDICARE

## 2024-10-21 VITALS — WEIGHT: 173 LBS | BODY MASS INDEX: 28.82 KG/M2 | HEIGHT: 65 IN | RESPIRATION RATE: 19 BRPM

## 2024-10-21 DIAGNOSIS — K43.9 SPIGELIAN HERNIA: Primary | ICD-10-CM

## 2024-10-21 PROCEDURE — 1159F MED LIST DOCD IN RCRD: CPT | Performed by: SURGERY

## 2024-10-21 PROCEDURE — 99203 OFFICE O/P NEW LOW 30 MIN: CPT | Performed by: SURGERY

## 2024-10-21 PROCEDURE — 1160F RVW MEDS BY RX/DR IN RCRD: CPT | Performed by: SURGERY

## 2024-10-21 RX ORDER — SODIUM CHLORIDE 0.9 % (FLUSH) 0.9 %
10 SYRINGE (ML) INJECTION EVERY 12 HOURS SCHEDULED
OUTPATIENT
Start: 2024-10-21

## 2024-10-21 RX ORDER — SODIUM CHLORIDE 0.9 % (FLUSH) 0.9 %
1-10 SYRINGE (ML) INJECTION AS NEEDED
OUTPATIENT
Start: 2024-10-21

## 2024-10-21 NOTE — PROGRESS NOTES
Chief Complaint: Hernia    Subjective         Hernia      Shreya Marino is a 79 y.o. female presents to [facility] to be seen for spigelian hernia.  Her imaging is shown below:    Study Result    Narrative & Impression   MRI PELVIS WO CONTRAST     Date of Exam: 10/9/2024 12:10 PM EDT     Indication: hip pain, felt a pop when doing yardwork. Pain for almost 2 weeks now..     Comparison: 10/3/2024 radiographs     Technique:  Routine multiplanar/multisequence images of the pelvis were obtained without contrast administration.          Findings:  There is no evidence of acute fracture or avascular necrosis. There is normal pelvic bone alignment. Mild bilateral SI joint and pubic symphysis degenerative change. No suspicious bone lesion.     There are mild bilateral hip degenerative changes including possible nondisplaced tearing of the left anterior superior acetabular labrum. No evidence of full-thickness articular cartilage loss or significant hip joint effusion.     There is mild bilateral distal gluteal tendinopathy without evidence of discrete tear. The bilateral iliopsoas, adductor, and hamstrings myotendinous structures are intact. No evidence of asymmetric fatty muscle atrophy.     There is a small fat-containing left anterior abdominal wall hernia measuring 2.8 cm on series 4 image 13. There is surrounding inflammatory change with small volume fluid and fat stranding. The included intrapelvic structures show no acute abnormality.   Colonic diverticulosis. Hysterectomy. Multilobulated Bartholin gland cyst.     IMPRESSION:  Impression:  There is a 2.8 cm fat-containing left anterior abdominal wall Spigelian type hernia with surrounding inflammatory change, which may represent patient's reported palpable mass.     No evidence of fracture.     Mild bilateral distal gluteal tendinopathy without evidence of tear.     Age-appropriate degenerative changes including mild bilateral  hip arthritis.               Objective     Past Medical History:   Diagnosis Date    Atherosclerosis of coronary artery 07/06/2016    with previous stent/PCI (June 2016);     Biceps tendinitis of right upper extremity 04/25/2018    CAD (coronary artery disease) 07/06/2016    Chest pain     CHF (congestive heart failure)     Heart attack     Heart burn     HLD (hyperlipidemia) 01/28/2021    Hypertension, essential 10/20/2021    Nasal fracture     Right rotator cuff tear 04/25/2018    Right shoulder pain     SLAP tear of shoulder 04/25/2018    Subacromial impingement of right shoulder 04/18/2018    Vitamin D deficiency 01/28/2021       Past Surgical History:   Procedure Laterality Date    BREAST BIOPSY Left     CAROTID STENT      CATARACT EXTRACTION      CORONARY ANGIOPLASTY WITH STENT PLACEMENT      HYSTERECTOMY      NASAL SEPTUM SURGERY  07/10/2018    CLOSED REDUCTION, FRACTURE    RENAL ARTERY STENT      VEIN SURGERY           Current Outpatient Medications:     acetaminophen (Tylenol 8 Hour) 650 MG 8 hr tablet, Take 2 tablets by mouth Every 8 (Eight) Hours As Needed for Moderate Pain., Disp: 26 tablet, Rfl: 0    aspirin 81 MG EC tablet, aspirin 81 mg oral tablet,delayed release (DR/EC) take 1 tablet (81 mg) by oral route once daily   Active, Disp: , Rfl:     BIOTIN PO, Take  by mouth., Disp: , Rfl:     Calcium Carbonate-Vit D-Min (CALTRATE 600+D PLUS PO), Take 1 tablet by mouth Daily., Disp: , Rfl:     cetirizine (zyrTEC) 10 MG tablet, Take 1 tablet by mouth Daily., Disp: , Rfl:     Diclofenac Sodium (VOLTAREN) 1 % gel gel, Apply 4 g topically to the appropriate area as directed 4 (Four) Times a Day As Needed (pain)., Disp: 100 g, Rfl: 1    ezetimibe (ZETIA) 10 MG tablet, TAKE ONE TABLET BY MOUTH DAILY, Disp: 90 tablet, Rfl: 1    famotidine (PEPCID) 20 MG tablet, Take 1 tablet by mouth 2 (Two) Times a Day., Disp: , Rfl:     multivitamin with minerals tablet tablet, Take 1 tablet by mouth Daily., Disp: , Rfl:      "nitroglycerin (NITROSTAT) 0.4 MG SL tablet, 1 under the tongue as needed for angina, may repeat q5mins for up three doses, Disp: 25 tablet, Rfl: 2    ondansetron (Zofran) 4 MG tablet, Take 1 tablet by mouth Every 8 (Eight) Hours As Needed for Nausea or Vomiting., Disp: 14 tablet, Rfl: 0    pitavastatin calcium (Livalo) 2 MG tablet tablet, Take 1 tablet by mouth Every Night., Disp: 90 tablet, Rfl: 3    vitamin B-12 (CYANOCOBALAMIN) 1000 MCG tablet, Take 1 tablet by mouth Daily., Disp: , Rfl:     No Known Allergies     Family History   Problem Relation Age of Onset    Breast cancer Other     Heart disease Other     Cancer Other     Heart attack Mother         Death 2022       Social History     Socioeconomic History    Marital status:    Tobacco Use    Smoking status: Never    Smokeless tobacco: Never    Tobacco comments:     Father smoked, i never smoked   Vaping Use    Vaping status: Never Used   Substance and Sexual Activity    Alcohol use: Never    Drug use: Never    Sexual activity: Not Currently     Partners: Female     Birth control/protection: Condom, Post-menopausal       Vital Signs:   Resp 19   Ht 165.1 cm (65\")   Wt 78.5 kg (173 lb)   BMI 28.79 kg/m²    Review of Systems    Physical Exam  Vitals and nursing note reviewed.   Constitutional:       General: She is not in acute distress.     Appearance: Normal appearance. She is well-developed.   HENT:      Head: Normocephalic and atraumatic.   Eyes:      Extraocular Movements: Extraocular movements intact.      Pupils: Pupils are equal, round, and reactive to light.   Cardiovascular:      Pulses: Normal pulses.   Pulmonary:      Effort: Pulmonary effort is normal. No retractions.      Breath sounds: Normal air entry. No wheezing.   Abdominal:      General: There is no distension.      Palpations: Abdomen is soft.      Tenderness: There is no abdominal tenderness.      Hernia: A hernia is present.      Comments: Palpable mass in LLQ  "   Musculoskeletal:         General: No swelling or deformity.      Cervical back: Neck supple.   Skin:     General: Skin is warm and dry.      Findings: No erythema.   Neurological:      General: No focal deficit present.      Mental Status: She is alert and oriented to person, place, and time.      Motor: Motor function is intact.   Psychiatric:         Mood and Affect: Mood normal.         Thought Content: Thought content normal.          Result Review :               Assessment and Plan    Diagnoses and all orders for this visit:    1. Spigelian hernia (Primary)  -     Case Request; Standing  -     Follow Anesthesia Guidelines / Protocol; Standing  -     Verify / Perform Chlorhexidine Skin Prep; Standing  -     Instructions on coughing, deep breathing, and incentive spirometry.; Standing  -     Insert Peripheral IV; Standing  -     Saline Lock & Maintain IV Access; Standing  -     sodium chloride 0.9 % flush 10 mL  -     sodium chloride 0.9 % flush 1-10 mL  -     Place Sequential Compression Device; Standing  -     Maintain Sequential Compression Device; Standing  -     ceFAZolin (ANCEF) 2,000 mg in sodium chloride 0.9 % 100 mL IVPB  -     Case Request    Will plan for hernia repair on 11/19    Follow Up   Return for Next scheduled followup after surgery.  Patient was given instructions and counseling regarding her condition or for health maintenance advice. Please see specific information pulled into the AVS if appropriate.         This document has been electronically signed by Marisol Kaplan MD  October 21, 2024 10:56 EDT

## 2024-11-12 ENCOUNTER — OFFICE VISIT (OUTPATIENT)
Dept: FAMILY MEDICINE CLINIC | Facility: CLINIC | Age: 79
End: 2024-11-12
Payer: MEDICARE

## 2024-11-12 VITALS
WEIGHT: 175 LBS | HEIGHT: 65 IN | OXYGEN SATURATION: 98 % | SYSTOLIC BLOOD PRESSURE: 124 MMHG | DIASTOLIC BLOOD PRESSURE: 66 MMHG | HEART RATE: 104 BPM | BODY MASS INDEX: 29.16 KG/M2

## 2024-11-12 DIAGNOSIS — Z13.29 SCREENING FOR THYROID DISORDER: ICD-10-CM

## 2024-11-12 DIAGNOSIS — K21.9 GASTROESOPHAGEAL REFLUX DISEASE WITHOUT ESOPHAGITIS: Primary | ICD-10-CM

## 2024-11-12 DIAGNOSIS — E53.8 B12 DEFICIENCY: ICD-10-CM

## 2024-11-12 DIAGNOSIS — E78.5 HYPERLIPIDEMIA, UNSPECIFIED HYPERLIPIDEMIA TYPE: ICD-10-CM

## 2024-11-12 PROCEDURE — 1160F RVW MEDS BY RX/DR IN RCRD: CPT | Performed by: NURSE PRACTITIONER

## 2024-11-12 PROCEDURE — 3074F SYST BP LT 130 MM HG: CPT | Performed by: NURSE PRACTITIONER

## 2024-11-12 PROCEDURE — 3078F DIAST BP <80 MM HG: CPT | Performed by: NURSE PRACTITIONER

## 2024-11-12 PROCEDURE — 99214 OFFICE O/P EST MOD 30 MIN: CPT | Performed by: NURSE PRACTITIONER

## 2024-11-12 PROCEDURE — 1159F MED LIST DOCD IN RCRD: CPT | Performed by: NURSE PRACTITIONER

## 2024-11-12 PROCEDURE — 1125F AMNT PAIN NOTED PAIN PRSNT: CPT | Performed by: NURSE PRACTITIONER

## 2024-11-12 NOTE — PROGRESS NOTES
Chief Complaint  Hyperlipidemia and Heartburn, B12 def    Subjective            Shreya Marino presents to [facility]  History of Present Illness  Pt is a f/u for arthritis, HLD, b12 def and GERD. No issues or concerns at this time.     Pt is due labs/orders placed.    Pt is followed by Dr. Cueva with Cardiology.     Pt will be having sx on 11/19 with Marisol Kaplan due to  Spigelian hernia.         Past Medical History:   Diagnosis Date    Atherosclerosis of coronary artery 07/06/2016    with previous stent/PCI (June 2016);     Biceps tendinitis of right upper extremity 04/25/2018    CAD (coronary artery disease) 07/06/2016    Chest pain     CHF (congestive heart failure)     Heart attack     Heart burn     HLD (hyperlipidemia) 01/28/2021    Hypertension, essential 10/20/2021    Nasal fracture     Right rotator cuff tear 04/25/2018    Right shoulder pain     SLAP tear of shoulder 04/25/2018    Subacromial impingement of right shoulder 04/18/2018    Vitamin D deficiency 01/28/2021       No Known Allergies     Past Surgical History:   Procedure Laterality Date    BREAST BIOPSY Left     CAROTID STENT      CATARACT EXTRACTION      CORONARY ANGIOPLASTY WITH STENT PLACEMENT      HYSTERECTOMY      NASAL SEPTUM SURGERY  07/10/2018    CLOSED REDUCTION, FRACTURE    RENAL ARTERY STENT      VEIN SURGERY          Social History     Tobacco Use    Smoking status: Never    Smokeless tobacco: Never    Tobacco comments:     Father smoked, i never smoked   Substance Use Topics    Alcohol use: Never       Family History   Problem Relation Age of Onset    Breast cancer Other     Heart disease Other     Cancer Other     Heart attack Mother         Death 2022        Current Outpatient Medications on File Prior to Visit   Medication Sig    acetaminophen (Tylenol 8 Hour) 650 MG 8 hr tablet Take 2 tablets by mouth Every 8 (Eight) Hours As Needed for Moderate Pain.    aspirin 81 MG EC tablet aspirin  "81 mg oral tablet,delayed release (DR/EC) take 1 tablet (81 mg) by oral route once daily   Active    BIOTIN PO Take  by mouth.    Calcium Carbonate-Vit D-Min (CALTRATE 600+D PLUS PO) Take 1 tablet by mouth Daily.    cetirizine (zyrTEC) 10 MG tablet Take 1 tablet by mouth Daily.    ezetimibe (ZETIA) 10 MG tablet TAKE ONE TABLET BY MOUTH DAILY    famotidine (PEPCID) 20 MG tablet Take 1 tablet by mouth 2 (Two) Times a Day.    multivitamin with minerals tablet tablet Take 1 tablet by mouth Daily.    nitroglycerin (NITROSTAT) 0.4 MG SL tablet 1 under the tongue as needed for angina, may repeat q5mins for up three doses    pitavastatin calcium (Livalo) 2 MG tablet tablet Take 1 tablet by mouth Every Night.    vitamin B-12 (CYANOCOBALAMIN) 1000 MCG tablet Take 1 tablet by mouth Daily.    [DISCONTINUED] Diclofenac Sodium (VOLTAREN) 1 % gel gel Apply 4 g topically to the appropriate area as directed 4 (Four) Times a Day As Needed (pain). (Patient not taking: Reported on 11/12/2024)    [DISCONTINUED] ondansetron (Zofran) 4 MG tablet Take 1 tablet by mouth Every 8 (Eight) Hours As Needed for Nausea or Vomiting. (Patient not taking: Reported on 11/12/2024)     No current facility-administered medications on file prior to visit.       There are no preventive care reminders to display for this patient.      Objective     /66   Pulse 104   Ht 165.1 cm (65\")   Wt 79.4 kg (175 lb)   SpO2 98%   BMI 29.12 kg/m²       Physical Exam  Constitutional:       General: She is not in acute distress.     Appearance: Normal appearance. She is not ill-appearing.   HENT:      Head: Normocephalic and atraumatic.   Cardiovascular:      Rate and Rhythm: Normal rate and regular rhythm.      Heart sounds: Normal heart sounds. No murmur heard.  Pulmonary:      Effort: Pulmonary effort is normal. No respiratory distress.      Breath sounds: Normal breath sounds.   Chest:      Chest wall: No tenderness.   Abdominal:      General: Abdomen is " flat. Bowel sounds are normal. There is no distension.      Palpations: Abdomen is soft. There is no mass.      Tenderness: There is no abdominal tenderness. There is no guarding.   Musculoskeletal:         General: No swelling or tenderness. Normal range of motion.      Cervical back: Normal range of motion and neck supple.   Skin:     General: Skin is warm and dry.      Findings: No rash.   Neurological:      General: No focal deficit present.      Mental Status: She is alert and oriented to person, place, and time. Mental status is at baseline.      Gait: Gait normal.   Psychiatric:         Mood and Affect: Mood normal.         Behavior: Behavior normal.         Thought Content: Thought content normal.         Judgment: Judgment normal.           Result Review :                           Assessment and Plan        Diagnoses and all orders for this visit:    1. Gastroesophageal reflux disease without esophagitis (Primary)  Comments:  stable on Pepcid 20mg, continue    2. Hyperlipidemia, unspecified hyperlipidemia type  Comments:  stable on Livalo 2mg and Zetia 10mg, continue, continue f/u with Cardiology for mgmt  Orders:  -     CBC w AUTO Differential; Future  -     Comprehensive metabolic panel; Future  -     Lipid panel; Future    3. B12 deficiency  Comments:  stable on B1 1000mcg, continue  Orders:  -     Vitamin B12; Future    4. Screening for thyroid disorder  -     TSH; Future              Follow Up     Return in about 6 months (around 5/12/2025).    Patient was given instructions and counseling regarding her condition or for health maintenance advice. Please see specific information pulled into the AVS if appropriate.     Shreya Sada Marino  reports that she has never smoked. She has never used smokeless tobacco.

## 2024-11-15 NOTE — PRE-PROCEDURE INSTRUCTIONS
IMPORTANT INSTRUCTIONS - PRE-ADMISSION TESTING  DO NOT EAT, DRINK OR CHEW anything after midnight the night before your procedure.    Take the following medications the morning of your procedure with JUST A SIP OF WATER:TYLENOL IF NEEDED, ZYRTEC IF NEEDED, EZETIMIBE, PEPCID    DO NOT BRING your medications to the hospital with you, UNLESS something has changed since your PRE-Admission Testing appointment.  Hold all vitamins, supplements, and NSAIDS (Non- steroidal anti-inflammatory meds) for one week prior to surgery (you MAY take Tylenol or Acetaminophen).  If you are diabetic, check your blood sugar the morning of your procedure. If it is less than 70 or if you are feeling symptomatic, call the following number for further instructions: [phone] [facility].  Use your inhalers/nebulizers as usual, the morning of your procedure. BRING YOUR INHALERS with you.   Bring your CPAP or BIPAP to hospital, ONLY IF YOU WILL BE SPENDING THE NIGHT.   Make sure you have a ride home and have someone who will stay with you the day of your procedure after you go home.  If you have any questions, please call your Pre-Admission Testing NurseEDILBERTO at [phone]_.   Per anesthesia request, do not smoke for 24 hours before your procedure or as instructed by your surgeon.    FOLLOWING SHOWER WITH ANTIBACTERIAL SOAP NOTHING ON SKIN SUCH AS CREAM, LOTION, DEODORANT, POLISH OR MAKEUP

## 2024-11-19 ENCOUNTER — APPOINTMENT (OUTPATIENT)
Dept: GENERAL RADIOLOGY | Facility: HOSPITAL | Age: 79
End: 2024-11-19
Payer: MEDICARE

## 2024-11-19 ENCOUNTER — ANESTHESIA (OUTPATIENT)
Dept: PERIOP | Facility: HOSPITAL | Age: 79
End: 2024-11-19
Payer: MEDICARE

## 2024-11-19 ENCOUNTER — HOSPITAL ENCOUNTER (OUTPATIENT)
Facility: HOSPITAL | Age: 79
Setting detail: HOSPITAL OUTPATIENT SURGERY
Discharge: HOME | End: 2024-11-19
Attending: SURGERY | Admitting: SURGERY
Payer: MEDICARE

## 2024-11-19 ENCOUNTER — ANESTHESIA EVENT (OUTPATIENT)
Dept: PERIOP | Facility: HOSPITAL | Age: 79
End: 2024-11-19
Payer: MEDICARE

## 2024-11-19 VITALS
OXYGEN SATURATION: 98 % | RESPIRATION RATE: 19 BRPM | HEIGHT: 65 IN | SYSTOLIC BLOOD PRESSURE: 156 MMHG | TEMPERATURE: 99 F | BODY MASS INDEX: 29.02 KG/M2 | DIASTOLIC BLOOD PRESSURE: 95 MMHG | WEIGHT: 174.16 LBS | HEART RATE: 111 BPM

## 2024-11-19 DIAGNOSIS — K43.9 SPIGELIAN HERNIA: ICD-10-CM

## 2024-11-19 PROCEDURE — G0463 HOSPITAL OUTPT CLINIC VISIT: HCPCS | Performed by: SURGERY

## 2024-11-19 PROCEDURE — 71045 X-RAY EXAM CHEST 1 VIEW: CPT

## 2024-11-19 RX ORDER — IPRATROPIUM BROMIDE AND ALBUTEROL SULFATE 2.5; .5 MG/3ML; MG/3ML
3 SOLUTION RESPIRATORY (INHALATION) ONCE
Status: COMPLETED | OUTPATIENT
Start: 2024-11-19 | End: 2024-11-19

## 2024-11-19 RX ORDER — SODIUM CHLORIDE 0.9 % (FLUSH) 0.9 %
10 SYRINGE (ML) INJECTION EVERY 12 HOURS SCHEDULED
Status: DISCONTINUED | OUTPATIENT
Start: 2024-11-19 | End: 2024-11-19 | Stop reason: HOSPADM

## 2024-11-19 RX ORDER — SODIUM CHLORIDE 0.9 % (FLUSH) 0.9 %
1-10 SYRINGE (ML) INJECTION AS NEEDED
Status: DISCONTINUED | OUTPATIENT
Start: 2024-11-19 | End: 2024-11-19 | Stop reason: HOSPADM

## 2024-11-19 RX ADMIN — IPRATROPIUM BROMIDE AND ALBUTEROL SULFATE 3 ML: .5; 3 SOLUTION RESPIRATORY (INHALATION) at 13:40

## 2024-11-19 NOTE — ANESTHESIA PREPROCEDURE EVALUATION
Anesthesia Evaluation     Patient summary reviewed and Nursing notes reviewed   history of anesthetic complications:  PONV  NPO Solid Status: > 8 hours  NPO Liquid Status: > 2 hours           Airway   Mallampati: II  TM distance: >3 FB  Neck ROM: full  No difficulty expected  Dental      Pulmonary - negative pulmonary ROS    breath sounds clear to auscultation  (+) ,wheezes  Cardiovascular - normal exam  Exercise tolerance: good (4-7 METS)    Rhythm: regular  Rate: normal    (+) hypertension, past MI  >12 months, CAD, cardiac stents more than 12 months ago , CHF Diastolic >=55%, hyperlipidemia      Neuro/Psych- negative ROS  GI/Hepatic/Renal/Endo    (+) GERD    Musculoskeletal (-) negative ROS    Abdominal    Substance History - negative use     OB/GYN negative ob/gyn ROS         Other - negative ROS       ROS/Med Hx Other: >4METS.HX HTN,CHF,HLD,GERD,MI/STENT 2016,NASAL FX/REPAIR. ECHO 3/18/24 EF 66-70%,NO ISCHEMIA. PCP OV 11/12/24,CARDS OV 3/26/24 F/U 6MTHS. KT       Phys Exam Other: Some scattered wheezes     Interpretation Summary echo 3/18/24       •  Technically difficult study.  •  Left ventricular ejection fraction appears to be 66 - 70%.  Mildly hyperdynamic.  •  Left ventricular diastolic function is consistent with (grade I) impaired relaxation and age.  •  No significant valvular disease.       ECG 12 Lead 1/16/24     Date/Time: 1/16/2024 3:23 PM  Performed by: Beni Cueva MD     Authorized by: Beni Cueva MD  Comparison: compared with previous ECG from 10/21/2021  Similar to previous ECG  Comparison to previous ECG: 3 PACs noted.             Anesthesia Plan    ASA 3     general     (Patient understands anesthesia not responsible for dental damage.    Pt presenting with cough today and some scattered wheezes bilaterally, low grade temperature, will order cxr and duo neb, will reevaluate once cxr completed    Cxr today Low lung volumes. No active disease is seen.      With amount of  coughing that pt is having and undergoing a hernia surgery after discussing w/ Dr. Kaplan we will bring her back for a different day  )  intravenous induction     Anesthetic plan, risks, benefits, and alternatives have been provided, discussed and informed consent has been obtained with: patient.    Use of blood products discussed with patient .    Plan discussed with CRNA.      CODE STATUS:

## 2024-11-27 ENCOUNTER — TELEPHONE (OUTPATIENT)
Dept: SURGERY | Facility: CLINIC | Age: 79
End: 2024-11-27

## 2024-11-27 NOTE — TELEPHONE ENCOUNTER
PT CX'D POST OP 11/27 W/ BRUNKHORST    PER MYCHART CX NOTE: (Surgery did not happen. We need to reschedule the hernia repair for January. Can you please give us a new date and time for that? Thanks.)     PLEASE ADVISE

## 2024-11-28 DIAGNOSIS — E78.5 HYPERLIPIDEMIA, UNSPECIFIED HYPERLIPIDEMIA TYPE: ICD-10-CM

## 2024-12-02 ENCOUNTER — LAB (OUTPATIENT)
Dept: LAB | Facility: HOSPITAL | Age: 79
End: 2024-12-02
Payer: MEDICARE

## 2024-12-02 DIAGNOSIS — Z13.29 SCREENING FOR THYROID DISORDER: ICD-10-CM

## 2024-12-02 DIAGNOSIS — E78.5 HYPERLIPIDEMIA, UNSPECIFIED HYPERLIPIDEMIA TYPE: ICD-10-CM

## 2024-12-02 DIAGNOSIS — K43.2 VENTRAL INCISIONAL HERNIA: Primary | ICD-10-CM

## 2024-12-02 DIAGNOSIS — E53.8 B12 DEFICIENCY: ICD-10-CM

## 2024-12-02 LAB
ALBUMIN SERPL-MCNC: 4.1 G/DL (ref 3.5–5.2)
ALBUMIN/GLOB SERPL: 1.5 G/DL
ALP SERPL-CCNC: 83 U/L (ref 39–117)
ALT SERPL W P-5'-P-CCNC: 25 U/L (ref 1–33)
ANION GAP SERPL CALCULATED.3IONS-SCNC: 13.6 MMOL/L (ref 5–15)
AST SERPL-CCNC: 24 U/L (ref 1–32)
BASOPHILS # BLD AUTO: 0.05 10*3/MM3 (ref 0–0.2)
BASOPHILS NFR BLD AUTO: 0.6 % (ref 0–1.5)
BILIRUB SERPL-MCNC: 0.7 MG/DL (ref 0–1.2)
BUN SERPL-MCNC: 18 MG/DL (ref 8–23)
BUN/CREAT SERPL: 20.7 (ref 7–25)
CALCIUM SPEC-SCNC: 10.3 MG/DL (ref 8.6–10.5)
CHLORIDE SERPL-SCNC: 103 MMOL/L (ref 98–107)
CHOLEST SERPL-MCNC: 161 MG/DL (ref 0–200)
CO2 SERPL-SCNC: 24.4 MMOL/L (ref 22–29)
CREAT SERPL-MCNC: 0.87 MG/DL (ref 0.57–1)
DEPRECATED RDW RBC AUTO: 42.5 FL (ref 37–54)
EGFRCR SERPLBLD CKD-EPI 2021: 67 ML/MIN/1.73
EOSINOPHIL # BLD AUTO: 0.11 10*3/MM3 (ref 0–0.4)
EOSINOPHIL NFR BLD AUTO: 1.3 % (ref 0.3–6.2)
ERYTHROCYTE [DISTWIDTH] IN BLOOD BY AUTOMATED COUNT: 12.4 % (ref 12.3–15.4)
GLOBULIN UR ELPH-MCNC: 2.8 GM/DL
GLUCOSE SERPL-MCNC: 100 MG/DL (ref 65–99)
HCT VFR BLD AUTO: 43.9 % (ref 34–46.6)
HDLC SERPL-MCNC: 43 MG/DL (ref 40–60)
HGB BLD-MCNC: 14.4 G/DL (ref 12–15.9)
IMM GRANULOCYTES # BLD AUTO: 0.04 10*3/MM3 (ref 0–0.05)
IMM GRANULOCYTES NFR BLD AUTO: 0.5 % (ref 0–0.5)
LDLC SERPL CALC-MCNC: 90 MG/DL (ref 0–100)
LDLC/HDLC SERPL: 2 {RATIO}
LYMPHOCYTES # BLD AUTO: 3.04 10*3/MM3 (ref 0.7–3.1)
LYMPHOCYTES NFR BLD AUTO: 35.6 % (ref 19.6–45.3)
MCH RBC QN AUTO: 30.8 PG (ref 26.6–33)
MCHC RBC AUTO-ENTMCNC: 32.8 G/DL (ref 31.5–35.7)
MCV RBC AUTO: 94 FL (ref 79–97)
MONOCYTES # BLD AUTO: 0.48 10*3/MM3 (ref 0.1–0.9)
MONOCYTES NFR BLD AUTO: 5.6 % (ref 5–12)
NEUTROPHILS NFR BLD AUTO: 4.82 10*3/MM3 (ref 1.7–7)
NEUTROPHILS NFR BLD AUTO: 56.4 % (ref 42.7–76)
NRBC BLD AUTO-RTO: 0 /100 WBC (ref 0–0.2)
PLATELET # BLD AUTO: 329 10*3/MM3 (ref 140–450)
PMV BLD AUTO: 10.5 FL (ref 6–12)
POTASSIUM SERPL-SCNC: 4.3 MMOL/L (ref 3.5–5.2)
PROT SERPL-MCNC: 6.9 G/DL (ref 6–8.5)
RBC # BLD AUTO: 4.67 10*6/MM3 (ref 3.77–5.28)
SODIUM SERPL-SCNC: 141 MMOL/L (ref 136–145)
TRIGL SERPL-MCNC: 159 MG/DL (ref 0–150)
TSH SERPL DL<=0.05 MIU/L-ACNC: 2.87 UIU/ML (ref 0.27–4.2)
VIT B12 BLD-MCNC: 727 PG/ML (ref 211–946)
VLDLC SERPL-MCNC: 28 MG/DL (ref 5–40)
WBC NRBC COR # BLD AUTO: 8.54 10*3/MM3 (ref 3.4–10.8)

## 2024-12-02 PROCEDURE — 82607 VITAMIN B-12: CPT

## 2024-12-02 PROCEDURE — 36415 COLL VENOUS BLD VENIPUNCTURE: CPT

## 2024-12-02 PROCEDURE — 84443 ASSAY THYROID STIM HORMONE: CPT

## 2024-12-02 PROCEDURE — 85025 COMPLETE CBC W/AUTO DIFF WBC: CPT

## 2024-12-02 PROCEDURE — 80061 LIPID PANEL: CPT

## 2024-12-02 PROCEDURE — 80053 COMPREHEN METABOLIC PANEL: CPT

## 2024-12-02 RX ORDER — EZETIMIBE 10 MG/1
10 TABLET ORAL DAILY
Qty: 90 TABLET | Refills: 3 | Status: SHIPPED | OUTPATIENT
Start: 2024-12-02

## 2025-01-27 RX ORDER — EZETIMIBE 10 MG/1
10 TABLET ORAL DAILY
COMMUNITY

## 2025-01-27 NOTE — PRE-PROCEDURE INSTRUCTIONS
ATIENT INSTRUCTED TO BE:    - NOTHING TO EAT AFTER MIDNIGHT OR CHEW, EXCEPT CAN HAVE CLEAR LIQUIDS 2 HOURS PRIOR TO SURGERY ARRIVAL TIME , NO MORE THAN 8 OZ. (NOTHING RED)     - TO HOLD ALL VITAMINS, SUPPLEMENTS, NSAIDS FOR ONE WEEK PRIOR TO THEIR SURGICAL PROCEDURE    - DO NOT TAKE ______________________ 7 DAYS PRIOR TO PROCEDURE PER ANESTHESIA RECOMMENDATIONS/INSTRUCTIONS     - INSTRUCTED PT TO USE SURGICAL SOAP 1 TIME THE NIGHT PRIOR TO SURGERY ___________ OR THE AM OF SURGERY _____________   USE THE SOAP FROM NECK TO TOES, AVOID THEIR FACE, HAIR, AND PRIVATE PARTS. IF USE THE SOAP THE NIGHT PRIOR TO SURGERY, CHANGE BED LINENS AND NO PETS IN THE BED.     INSTRUCTED NO LOTIONS, JEWELRY, PIERCINGS,  NAIL POLISH, OR DEODORANT DAY OF SURGERY    - IF DIABETIC, CHECK BLOOD GLUCOSE IF LESS THAN 70 OR HAVING SYMPTOMS CALL THE PREOP AREA FOR INSTRUCTIONS ON AM OF SURGERY ([phone]   -INSTRUCTED TO TAKE THE FOLLOWING MEDICATIONS THE DAY OF SURGERY WITH SIPS OF WATER:   ZETIA, PEPCID IF NEEDED.         - DO NOT BRING ANY MEDICATIONS WITH YOU TO THE HOSPITAL THE DAY OF SURGERY, EXCEPT IF USE INHALERS. BRING INHALERS DAY OF SURGERY       - BRING CPAP OR BIPAP TO THE HOSPITAL ONLY IF YOU ARE SPENDING THE NIGHT    - DO NOT SMOKE OR VAPE 24 HOURS PRIOR TO PROCEDURE PER ANESTHESIA REQUEST     -MAKE SURE YOU HAVE A RIDE HOME OR SOMEONE TO STAY WITH YOU THE DAY OF THE PROCEDURE AFTER YOU GO HOME     - FOLLOW ANY OTHER INSTRUCTIONS GIVEN TO YOU BY YOUR SURGEON'S OFFICE.     - DAY OF SURGERY ____________, [place], [address] [place]. [phone]- YOU WILL RECEIVE A PHONE CALL THE DAY PRIOR TO SURGERY BETWEEN 1PM AND 4 PM WITH ARRIVAL TIME, IF YOUR SURGERY IS ON A MONDAY YOU WILL RECEIVE A CALL THE FRIDAY PRIOR TO SURGERY DATE    - BRING CASH OR CREDIT CARD FOR COPAYMENT OF MEDICATIONS AFTER SURGERY IF YOU USE THE HOSPITAL PHARMACY (MED[facility] TO BED)    - PREADMISSION TESTING NURSE ELLYN MCFARLANE [phone] IF HAVE  ANY QUESTIONS     -PATIENT PROVIDED THE NUMBER FOR PREOP SURGICAL DEPT IF HAD QUESTIONS AFTER HOURS PRIOR TO SURGERY ([phone] INFORMED PT IF NO ANSWER, LEAVE A MESSAGE AND SOMEONE WILL RETURN THEIR CALL       PATIENT VERBALIZED UNDERSTANDING

## 2025-01-28 ENCOUNTER — HOSPITAL ENCOUNTER (OUTPATIENT)
Facility: HOSPITAL | Age: 80
Setting detail: HOSPITAL OUTPATIENT SURGERY
Discharge: HOME | End: 2025-01-28
Attending: SURGERY | Admitting: SURGERY
Payer: MEDICARE

## 2025-01-28 ENCOUNTER — ANESTHESIA EVENT (OUTPATIENT)
Dept: PERIOP | Facility: HOSPITAL | Age: 80
End: 2025-01-28
Payer: MEDICARE

## 2025-01-28 ENCOUNTER — ANESTHESIA (OUTPATIENT)
Dept: PERIOP | Facility: HOSPITAL | Age: 80
End: 2025-01-28
Payer: MEDICARE

## 2025-01-28 VITALS
HEIGHT: 64 IN | HEART RATE: 69 BPM | BODY MASS INDEX: 30.04 KG/M2 | SYSTOLIC BLOOD PRESSURE: 137 MMHG | DIASTOLIC BLOOD PRESSURE: 71 MMHG | RESPIRATION RATE: 18 BRPM | TEMPERATURE: 97.1 F | WEIGHT: 175.93 LBS | OXYGEN SATURATION: 93 %

## 2025-01-28 DIAGNOSIS — K43.2 VENTRAL INCISIONAL HERNIA: ICD-10-CM

## 2025-01-28 LAB
QT INTERVAL: 389 MS
QTC INTERVAL: 435 MS

## 2025-01-28 PROCEDURE — 25010000002 PROPOFOL 10 MG/ML EMULSION: Performed by: NURSE ANESTHETIST, CERTIFIED REGISTERED

## 2025-01-28 PROCEDURE — 25010000002 LIDOCAINE PF 2% 2 % SOLUTION: Performed by: NURSE ANESTHETIST, CERTIFIED REGISTERED

## 2025-01-28 PROCEDURE — 49591 RPR AA HRN 1ST < 3 CM RDC: CPT | Performed by: SURGERY

## 2025-01-28 PROCEDURE — 93005 ELECTROCARDIOGRAM TRACING: CPT | Performed by: SURGERY

## 2025-01-28 PROCEDURE — S0260 H&P FOR SURGERY: HCPCS | Performed by: SURGERY

## 2025-01-28 PROCEDURE — 25010000002 SUGAMMADEX 200 MG/2ML SOLUTION: Performed by: NURSE ANESTHETIST, CERTIFIED REGISTERED

## 2025-01-28 PROCEDURE — 25810000003 LACTATED RINGERS PER 1000 ML: Performed by: ANESTHESIOLOGY

## 2025-01-28 PROCEDURE — C1781 MESH (IMPLANTABLE): HCPCS | Performed by: SURGERY

## 2025-01-28 PROCEDURE — 25010000002 DEXAMETHASONE PER 1 MG: Performed by: NURSE ANESTHETIST, CERTIFIED REGISTERED

## 2025-01-28 PROCEDURE — 25010000002 CEFAZOLIN PER 500 MG: Performed by: NURSE ANESTHETIST, CERTIFIED REGISTERED

## 2025-01-28 PROCEDURE — 25010000002 ONDANSETRON PER 1 MG: Performed by: NURSE ANESTHETIST, CERTIFIED REGISTERED

## 2025-01-28 PROCEDURE — 25010000002 FENTANYL CITRATE (PF) 50 MCG/ML SOLUTION: Performed by: NURSE ANESTHETIST, CERTIFIED REGISTERED

## 2025-01-28 PROCEDURE — 25010000002 MIDAZOLAM PER 1MG: Performed by: ANESTHESIOLOGY

## 2025-01-28 PROCEDURE — 88302 TISSUE EXAM BY PATHOLOGIST: CPT | Performed by: SURGERY

## 2025-01-28 PROCEDURE — 25010000002 KETOROLAC TROMETHAMINE PER 15 MG: Performed by: NURSE ANESTHETIST, CERTIFIED REGISTERED

## 2025-01-28 PROCEDURE — 25010000002 BUPIVACAINE (PF) 0.25 % SOLUTION: Performed by: SURGERY

## 2025-01-28 DEVICE — VENTRALIGHT ST MESH WITH ECHO PS POSITIONING SYSTEM, 4" X 6" (10.2 X 15.2 CM), ELLIPSE
Type: IMPLANTABLE DEVICE | Site: ABDOMEN | Status: FUNCTIONAL
Brand: VENTRALIGHT

## 2025-01-28 DEVICE — ABSORBABLE WOUND CLOSURE DEVICE
Type: IMPLANTABLE DEVICE | Site: ABDOMEN | Status: FUNCTIONAL
Brand: V-LOC 180

## 2025-01-28 RX ORDER — BUPIVACAINE HYDROCHLORIDE 2.5 MG/ML
INJECTION, SOLUTION EPIDURAL; INFILTRATION; INTRACAUDAL AS NEEDED
Status: DISCONTINUED | OUTPATIENT
Start: 2025-01-28 | End: 2025-01-28 | Stop reason: HOSPADM

## 2025-01-28 RX ORDER — PHENYLEPHRINE HCL IN 0.9% NACL 0.5 MG/5ML
SYRINGE (ML) INTRAVENOUS AS NEEDED
Status: DISCONTINUED | OUTPATIENT
Start: 2025-01-28 | End: 2025-01-28 | Stop reason: SURG

## 2025-01-28 RX ORDER — PROMETHAZINE HYDROCHLORIDE 25 MG/1
25 SUPPOSITORY RECTAL ONCE AS NEEDED
Status: DISCONTINUED | OUTPATIENT
Start: 2025-01-28 | End: 2025-01-28 | Stop reason: HOSPADM

## 2025-01-28 RX ORDER — CEFAZOLIN SODIUM 1 G/3ML
INJECTION, POWDER, FOR SOLUTION INTRAMUSCULAR; INTRAVENOUS AS NEEDED
Status: DISCONTINUED | OUTPATIENT
Start: 2025-01-28 | End: 2025-01-28 | Stop reason: SURG

## 2025-01-28 RX ORDER — ACETAMINOPHEN 500 MG
1000 TABLET ORAL ONCE
Status: COMPLETED | OUTPATIENT
Start: 2025-01-28 | End: 2025-01-28

## 2025-01-28 RX ORDER — DEXAMETHASONE SODIUM PHOSPHATE 4 MG/ML
INJECTION, SOLUTION INTRA-ARTICULAR; INTRALESIONAL; INTRAMUSCULAR; INTRAVENOUS; SOFT TISSUE AS NEEDED
Status: DISCONTINUED | OUTPATIENT
Start: 2025-01-28 | End: 2025-01-28 | Stop reason: SURG

## 2025-01-28 RX ORDER — HYDROCODONE BITARTRATE AND ACETAMINOPHEN 5; 325 MG/1; MG/1
1 TABLET ORAL EVERY 6 HOURS PRN
Qty: 20 TABLET | Refills: 0 | Status: SHIPPED | OUTPATIENT
Start: 2025-01-28

## 2025-01-28 RX ORDER — FENTANYL CITRATE 50 UG/ML
INJECTION, SOLUTION INTRAMUSCULAR; INTRAVENOUS AS NEEDED
Status: DISCONTINUED | OUTPATIENT
Start: 2025-01-28 | End: 2025-01-28 | Stop reason: SURG

## 2025-01-28 RX ORDER — PROPOFOL 10 MG/ML
VIAL (ML) INTRAVENOUS AS NEEDED
Status: DISCONTINUED | OUTPATIENT
Start: 2025-01-28 | End: 2025-01-28 | Stop reason: SURG

## 2025-01-28 RX ORDER — ROCURONIUM BROMIDE 10 MG/ML
INJECTION, SOLUTION INTRAVENOUS AS NEEDED
Status: DISCONTINUED | OUTPATIENT
Start: 2025-01-28 | End: 2025-01-28 | Stop reason: SURG

## 2025-01-28 RX ORDER — MAGNESIUM HYDROXIDE 1200 MG/15ML
LIQUID ORAL AS NEEDED
Status: DISCONTINUED | OUTPATIENT
Start: 2025-01-28 | End: 2025-01-28 | Stop reason: HOSPADM

## 2025-01-28 RX ORDER — ONDANSETRON 2 MG/ML
INJECTION INTRAMUSCULAR; INTRAVENOUS AS NEEDED
Status: DISCONTINUED | OUTPATIENT
Start: 2025-01-28 | End: 2025-01-28 | Stop reason: SURG

## 2025-01-28 RX ORDER — OXYCODONE HYDROCHLORIDE 5 MG/1
5 TABLET ORAL
Status: DISCONTINUED | OUTPATIENT
Start: 2025-01-28 | End: 2025-01-28 | Stop reason: HOSPADM

## 2025-01-28 RX ORDER — ONDANSETRON 2 MG/ML
4 INJECTION INTRAMUSCULAR; INTRAVENOUS ONCE AS NEEDED
Status: DISCONTINUED | OUTPATIENT
Start: 2025-01-28 | End: 2025-01-28 | Stop reason: HOSPADM

## 2025-01-28 RX ORDER — ONDANSETRON 4 MG/1
4 TABLET, ORALLY DISINTEGRATING ORAL ONCE AS NEEDED
Status: DISCONTINUED | OUTPATIENT
Start: 2025-01-28 | End: 2025-01-28 | Stop reason: HOSPADM

## 2025-01-28 RX ORDER — PROMETHAZINE HYDROCHLORIDE 25 MG/1
25 TABLET ORAL ONCE AS NEEDED
Status: DISCONTINUED | OUTPATIENT
Start: 2025-01-28 | End: 2025-01-28 | Stop reason: HOSPADM

## 2025-01-28 RX ORDER — MEPERIDINE HYDROCHLORIDE 25 MG/ML
12.5 INJECTION INTRAMUSCULAR; INTRAVENOUS; SUBCUTANEOUS
Status: DISCONTINUED | OUTPATIENT
Start: 2025-01-28 | End: 2025-01-28 | Stop reason: HOSPADM

## 2025-01-28 RX ORDER — KETOROLAC TROMETHAMINE 30 MG/ML
INJECTION, SOLUTION INTRAMUSCULAR; INTRAVENOUS AS NEEDED
Status: DISCONTINUED | OUTPATIENT
Start: 2025-01-28 | End: 2025-01-28 | Stop reason: SURG

## 2025-01-28 RX ORDER — EPHEDRINE SULFATE 50 MG/ML
INJECTION INTRAVENOUS AS NEEDED
Status: DISCONTINUED | OUTPATIENT
Start: 2025-01-28 | End: 2025-01-28 | Stop reason: SURG

## 2025-01-28 RX ORDER — MIDAZOLAM HYDROCHLORIDE 2 MG/2ML
1 INJECTION, SOLUTION INTRAMUSCULAR; INTRAVENOUS ONCE
Status: COMPLETED | OUTPATIENT
Start: 2025-01-28 | End: 2025-01-28

## 2025-01-28 RX ORDER — SODIUM CHLORIDE, SODIUM LACTATE, POTASSIUM CHLORIDE, CALCIUM CHLORIDE 600; 310; 30; 20 MG/100ML; MG/100ML; MG/100ML; MG/100ML
9 INJECTION, SOLUTION INTRAVENOUS CONTINUOUS PRN
Status: DISCONTINUED | OUTPATIENT
Start: 2025-01-28 | End: 2025-01-28 | Stop reason: HOSPADM

## 2025-01-28 RX ORDER — LIDOCAINE HYDROCHLORIDE 20 MG/ML
INJECTION, SOLUTION EPIDURAL; INFILTRATION; INTRACAUDAL; PERINEURAL AS NEEDED
Status: DISCONTINUED | OUTPATIENT
Start: 2025-01-28 | End: 2025-01-28 | Stop reason: SURG

## 2025-01-28 RX ADMIN — PROPOFOL 50 MG: 10 INJECTION, EMULSION INTRAVENOUS at 08:30

## 2025-01-28 RX ADMIN — SUGAMMADEX 200 MG: 100 INJECTION, SOLUTION INTRAVENOUS at 09:49

## 2025-01-28 RX ADMIN — PROPOFOL 120 MG: 10 INJECTION, EMULSION INTRAVENOUS at 08:26

## 2025-01-28 RX ADMIN — SODIUM CHLORIDE, POTASSIUM CHLORIDE, SODIUM LACTATE AND CALCIUM CHLORIDE 9 ML/HR: 600; 310; 30; 20 INJECTION, SOLUTION INTRAVENOUS at 07:51

## 2025-01-28 RX ADMIN — LIDOCAINE HYDROCHLORIDE 80 MG: 20 INJECTION, SOLUTION INTRAVENOUS at 08:24

## 2025-01-28 RX ADMIN — FENTANYL CITRATE 50 MCG: 50 INJECTION, SOLUTION INTRAMUSCULAR; INTRAVENOUS at 08:32

## 2025-01-28 RX ADMIN — DEXAMETHASONE SODIUM PHOSPHATE 4 MG: 4 INJECTION, SOLUTION INTRAMUSCULAR; INTRAVENOUS at 08:38

## 2025-01-28 RX ADMIN — PROPOFOL 50 MG: 10 INJECTION, EMULSION INTRAVENOUS at 09:43

## 2025-01-28 RX ADMIN — ROCURONIUM BROMIDE 50 MG: 50 INJECTION INTRAVENOUS at 08:28

## 2025-01-28 RX ADMIN — Medication 100 MCG: at 08:50

## 2025-01-28 RX ADMIN — OXYCODONE HYDROCHLORIDE 5 MG: 5 TABLET ORAL at 10:55

## 2025-01-28 RX ADMIN — FENTANYL CITRATE 50 MCG: 50 INJECTION, SOLUTION INTRAMUSCULAR; INTRAVENOUS at 08:24

## 2025-01-28 RX ADMIN — CEFAZOLIN 2 G: 1 INJECTION, POWDER, FOR SOLUTION INTRAMUSCULAR; INTRAVENOUS; PARENTERAL at 08:30

## 2025-01-28 RX ADMIN — MIDAZOLAM HYDROCHLORIDE 1 MG: 1 INJECTION, SOLUTION INTRAMUSCULAR; INTRAVENOUS at 08:13

## 2025-01-28 RX ADMIN — ONDANSETRON 4 MG: 2 INJECTION INTRAMUSCULAR; INTRAVENOUS at 08:57

## 2025-01-28 RX ADMIN — Medication 100 MCG: at 08:48

## 2025-01-28 RX ADMIN — EPHEDRINE SULFATE 10 MG: 50 INJECTION INTRAVENOUS at 09:54

## 2025-01-28 RX ADMIN — KETOROLAC TROMETHAMINE 30 MG: 30 INJECTION, SOLUTION INTRAMUSCULAR; INTRAVENOUS at 09:15

## 2025-01-28 RX ADMIN — Medication 50 MCG: at 09:28

## 2025-01-28 RX ADMIN — FENTANYL CITRATE 50 MCG: 50 INJECTION, SOLUTION INTRAMUSCULAR; INTRAVENOUS at 09:44

## 2025-01-28 RX ADMIN — ACETAMINOPHEN 1000 MG: 500 TABLET ORAL at 07:51

## 2025-01-28 NOTE — DISCHARGE INSTRUCTIONS
DISCHARGE INSTRUCTIONS  HERNIA      For your surgery you had:  General anesthesia (you may have a sore throat for the first 24 hours)  You may experience dizziness, drowsiness, or light-headedness for several hours following surgery/procedure.  Do not stay alone today or tonight.  Limit your activity for 24 hours.  Resume your diet slowly.  Follow whatever special dietary instructions you may have been given by your doctor.  You should not drive, operate machinery, drink alcohol, or sign legally binding documents for 24 hours or while you are taking pain medication.      NOTIFY YOUR DOCTOR IF YOU EXPERIENCE ANY OF THE FOLLOWING:  Temperature greater than 101 degrees Fahrenheit  Shaking Chills  Redness or excessive drainage from incision  Nausea, vomiting and/or pain that is not controlled by prescribed medications  Increase in bleeding or bleeding that is excessive  Unable to urinate in 6 hours after surgery  If unable to reach your doctor, please go to the closest Emergency Room You may remove dressings in 48 hours.  You may shower: in 48 hours.  Apply an ice pack for 24-48 hours.  Do not do any heavy lifting, pushing or pulling.  You may walk up and down stairs.  You may ride in a car but do not drive until instructed by your physician.  Avoid constipation.  If unable to urinate in 6 to 8 hours after surgery or urinating frequently in small amounts, notify your doctor or go to the nearest Emergency Room.      SPECIAL INSTRUCTIONS:  Follow verbal instructions given by Dr. Kaplan.      Last dose of pain medication was given at:  Tylenol (1000mg) last at 7:51am. Do not exceed 4000mg of Tylenol in a 24 hour period.  Toradol last at 9:15am. May take ibuprofen next at 3:15pm if able and needed.  Oxycodone 5mg last at 10:55am. May take norco next at 4:55pm if needed.

## 2025-01-28 NOTE — H&P
Chief Complaint: Hernia        Subjective  Hernia        Shreya Marino is a 79 y.o. female presents to [facility] to be seen for spigelian hernia.  Her imaging is shown below:    Study Result     Narrative & Impression   MRI PELVIS WO CONTRAST     Date of Exam: 10/9/2024 12:10 PM EDT     Indication: hip pain, felt a pop when doing yardwork. Pain for almost 2 weeks now..     Comparison: 10/3/2024 radiographs     Technique:  Routine multiplanar/multisequence images of the pelvis were obtained without contrast administration.          Findings:  There is no evidence of acute fracture or avascular necrosis. There is normal pelvic bone alignment. Mild bilateral SI joint and pubic symphysis degenerative change. No suspicious bone lesion.     There are mild bilateral hip degenerative changes including possible nondisplaced tearing of the left anterior superior acetabular labrum. No evidence of full-thickness articular cartilage loss or significant hip joint effusion.     There is mild bilateral distal gluteal tendinopathy without evidence of discrete tear. The bilateral iliopsoas, adductor, and hamstrings myotendinous structures are intact. No evidence of asymmetric fatty muscle atrophy.     There is a small fat-containing left anterior abdominal wall hernia measuring 2.8 cm on series 4 image 13. There is surrounding inflammatory change with small volume fluid and fat stranding. The included intrapelvic structures show no acute abnormality.   Colonic diverticulosis. Hysterectomy. Multilobulated Bartholin gland cyst.     IMPRESSION:  Impression:  There is a 2.8 cm fat-containing left anterior abdominal wall Spigelian type hernia with surrounding inflammatory change, which may represent patient's reported palpable mass.     No evidence of fracture.     Mild bilateral distal gluteal tendinopathy without evidence of tear.     Age-appropriate degenerative changes including mild bilateral  hip arthritis.                     Objective  Medical History        Past Medical History:   Diagnosis Date    Atherosclerosis of coronary artery 07/06/2016     with previous stent/PCI (June 2016);     Biceps tendinitis of right upper extremity 04/25/2018    CAD (coronary artery disease) 07/06/2016    Chest pain      CHF (congestive heart failure)      Heart attack      Heart burn      HLD (hyperlipidemia) 01/28/2021    Hypertension, essential 10/20/2021    Nasal fracture      Right rotator cuff tear 04/25/2018    Right shoulder pain      SLAP tear of shoulder 04/25/2018    Subacromial impingement of right shoulder 04/18/2018    Vitamin D deficiency 01/28/2021            Surgical History         Past Surgical History:   Procedure Laterality Date    BREAST BIOPSY Left      CAROTID STENT        CATARACT EXTRACTION        CORONARY ANGIOPLASTY WITH STENT PLACEMENT        HYSTERECTOMY        NASAL SEPTUM SURGERY   07/10/2018     CLOSED REDUCTION, FRACTURE    RENAL ARTERY STENT        VEIN SURGERY                  Current Medications      Current Outpatient Medications:     acetaminophen (Tylenol 8 Hour) 650 MG 8 hr tablet, Take 2 tablets by mouth Every 8 (Eight) Hours As Needed for Moderate Pain., Disp: 26 tablet, Rfl: 0    aspirin 81 MG EC tablet, aspirin 81 mg oral tablet,delayed release (DR/EC) take 1 tablet (81 mg) by oral route once daily   Active, Disp: , Rfl:     BIOTIN PO, Take  by mouth., Disp: , Rfl:     Calcium Carbonate-Vit D-Min (CALTRATE 600+D PLUS PO), Take 1 tablet by mouth Daily., Disp: , Rfl:     cetirizine (zyrTEC) 10 MG tablet, Take 1 tablet by mouth Daily., Disp: , Rfl:     Diclofenac Sodium (VOLTAREN) 1 % gel gel, Apply 4 g topically to the appropriate area as directed 4 (Four) Times a Day As Needed (pain)., Disp: 100 g, Rfl: 1    ezetimibe (ZETIA) 10 MG tablet, TAKE ONE TABLET BY MOUTH DAILY, Disp: 90 tablet, Rfl: 1    famotidine (PEPCID) 20 MG tablet, Take 1 tablet by mouth 2 (Two) Times a Day.,  "Disp: , Rfl:     multivitamin with minerals tablet tablet, Take 1 tablet by mouth Daily., Disp: , Rfl:     nitroglycerin (NITROSTAT) 0.4 MG SL tablet, 1 under the tongue as needed for angina, may repeat q5mins for up three doses, Disp: 25 tablet, Rfl: 2    ondansetron (Zofran) 4 MG tablet, Take 1 tablet by mouth Every 8 (Eight) Hours As Needed for Nausea or Vomiting., Disp: 14 tablet, Rfl: 0    pitavastatin calcium (Livalo) 2 MG tablet tablet, Take 1 tablet by mouth Every Night., Disp: 90 tablet, Rfl: 3    vitamin B-12 (CYANOCOBALAMIN) 1000 MCG tablet, Take 1 tablet by mouth Daily., Disp: , Rfl:         Allergies   No Known Allergies               Family History   Problem Relation Age of Onset    Breast cancer Other      Heart disease Other      Cancer Other      Heart attack Mother           Death 2022         Social History   Social History            Socioeconomic History    Marital status:    Tobacco Use    Smoking status: Never    Smokeless tobacco: Never    Tobacco comments:       Father smoked, i never smoked   Vaping Use    Vaping status: Never Used   Substance and Sexual Activity    Alcohol use: Never    Drug use: Never    Sexual activity: Not Currently       Partners: Female       Birth control/protection: Condom, Post-menopausal            Vital Signs:   Resp 19   Ht 165.1 cm (65\")   Wt 78.5 kg (173 lb)   BMI 28.79 kg/m²    Review of Systems     Physical Exam  Vitals and nursing note reviewed.   Constitutional:       General: She is not in acute distress.     Appearance: Normal appearance. She is well-developed.   HENT:      Head: Normocephalic and atraumatic.   Eyes:      Extraocular Movements: Extraocular movements intact.      Pupils: Pupils are equal, round, and reactive to light.   Cardiovascular:      Pulses: Normal pulses.   Pulmonary:      Effort: Pulmonary effort is normal. No retractions.      Breath sounds: Normal air entry. No wheezing.   Abdominal:      General: There is no " distension.      Palpations: Abdomen is soft.      Tenderness: There is no abdominal tenderness.      Hernia: A hernia is present.      Comments: Palpable mass in LLQ    Musculoskeletal:         General: No swelling or deformity.      Cervical back: Neck supple.   Skin:     General: Skin is warm and dry.      Findings: No erythema.   Neurological:      General: No focal deficit present.      Mental Status: She is alert and oriented to person, place, and time.      Motor: Motor function is intact.   Psychiatric:         Mood and Affect: Mood normal.         Thought Content: Thought content normal.               Result Review  :                  Assessment  Assessment and Plan    Diagnoses and all orders for this visit:     1. Spigelian hernia (Primary)  -     Case Request; Standing  -     Follow Anesthesia Guidelines / Protocol; Standing  -     Verify / Perform Chlorhexidine Skin Prep; Standing  -     Instructions on coughing, deep breathing, and incentive spirometry.; Standing  -     Insert Peripheral IV; Standing  -     Saline Lock & Maintain IV Access; Standing  -     sodium chloride 0.9 % flush 10 mL  -     sodium chloride 0.9 % flush 1-10 mL  -     Place Sequential Compression Device; Standing  -     Maintain Sequential Compression Device; Standing  -     ceFAZolin (ANCEF) 2,000 mg in sodium chloride 0.9 % 100 mL IVPB  -     Case Request     Will plan for hernia repair on 11/19     Follow Up   Return for Next scheduled followup after surgery.  Patient was given instructions and counseling regarding her condition or for health maintenance advice. Please see specific information pulled into the AVS if appropriate.

## 2025-01-28 NOTE — OP NOTE
VENTRAL / INCISIONAL HERNIA REPAIR LAPAROSCOPIC WITH DAVINCI ROBOT  Procedure Report    Patient Name:  Shreya Marino  YOB: 1945    Date of Surgery:  1/28/2025     Indications:  Spigelian hernia in LLQ    Pre-op Diagnosis:   Ventral incisional hernia [K43.2]       Post-Op Diagnosis Codes:     * Ventral incisional hernia [K43.2]    Procedure/CPT® Codes:  MA RPR AA HERNIA 1ST < 3 CM REDUCIBLE [50816]    Procedure(s):  VENTRAL SPIGELIAN HERNIA REPAIR LAPAROSCOPIC WITH DAVINCI ROBOT    Staff:  Surgeon(s):  Marisol Kaplan MD    Assistant: Scooter Novak    Anesthesia: General    Estimated Blood Loss: minimal    Implants:    Implant Name Type Inv. Item Serial No.  Lot No. LRB No. Used Action   DEV CLS WND VLOC/180 MICHELLE ABS 1/2CIR SZ2/0 23CM 37MM GRN - [id number] Implant DEV CLS WND VLOC/180 MICHELLE ABS 1/2CIR SZ2/0 23CM 37MM GRN  COVIDIEN [id number] N/A 2 Implanted   DEV CLS WND VLOC/180 MICHELLE ABS 1/2CIR SZ0 23CM 37MM GRN - [id number] Implant DEV CLS WND VLOC/180 MICHELLE ABS 1/2CIR SZ0 23CM 37MM GRN  COVIDIEN [id number] N/A 1 Implanted   MESH VENTRALIGHT ST ECHO PS ELLIPSE 4X6IN - [id number] Implant MESH VENTRALIGHT ST ECHO PS ELLIPSE 4X6IN  DAVOL  (DIV OF [employer]) [id number] N/A 1 Implanted       Specimen:          Specimens       ID Source Type Tests Collected By Collected At Frozen?    A Hernia, Sac Tissue TISSUE PATHOLOGY EXAM   Marisol Kaplan MD 1/28/25 0927     This specimen was not marked as sent.                Findings: none    Complications: none    Description of Procedure:   We began by making a small incision in the right upper quadrant.  A Veress needle was inserted in the standard fashion and after this the abdomen was insufflated with no obvious complications.  The site was enlarged to a 10/12 incision site and a 10/12 Optiview trocar was used to access the abdomen under direct visualization.  A 8 mm long trocar was placed in the right lower quadrant along with 2  additional 8 mm trocars along the right abdominal wall.  We were able to identify the hernia defect in LLQ.  The hernia contents were reduced.  This was done with ease and at this point in time we were able to see that the size of the hernia was about 2.8 cm.  The peritoneum around the edges was dissected off and the hernia sac reduced and then the defect was closed.  A 4 x 6 Echo Bard mesh was placed into the abdomen and the tubing was insufflated after it was brought through the hernia defect.  It was sutured into place with good coverage over all  sides of the hernia with stratafix suture.   The tubing was cut and the balloon was removed from the abdomen in its entirety and inspected once outside the abdomen.  The Optiview site was closed using a Faizan-Wilfrid.  Local anesthetic was injected and the skin  was closed with 4-0 sutures at all sites.  The patient tolerated the  procedure well and will follow up with me in one to two weeks.  Assistant: Scooter Novak  was responsible for performing the following activities: Retraction, Suction, and Held/Positioned Camera and their skilled assistance was necessary for the success of this case.         Marisol Kaplan MD     Date: 1/28/2025  Time: 10:03 EST

## 2025-01-28 NOTE — ANESTHESIA POSTPROCEDURE EVALUATION
Patient: Shreya Marino    Procedure Summary       Date: 01/28/25 Room / Location: [facility] OR [address] [place] [address] MAIN OR    Anesthesia Start: 0820 Anesthesia Stop: 1011    Procedure: VENTRAL / INCISIONAL HERNIA REPAIR LAPAROSCOPIC WITH DAVINCI ROBOT (Abdomen) Diagnosis:       Ventral incisional hernia      (Ventral incisional hernia [K43.2])    Surgeons: Marisol Kaplan MD Provider: Beni Ta MD    Anesthesia Type: general ASA Status: 3            Anesthesia Type: general    Vitals  Vitals Value Taken Time   /127 01/28/25 1033   Temp 36.5 °C (97.7 °F) 01/28/25 1030   Pulse 74 01/28/25 1037   Resp 22 01/28/25 1030   SpO2 94 % 01/28/25 1037   Vitals shown include unfiled device data.        Post Anesthesia Care and Evaluation    Patient location during evaluation: bedside  Patient participation: complete - patient participated  Level of consciousness: awake  Pain management: adequate    Airway patency: patent  PONV Status: none  Cardiovascular status: acceptable and stable (last /85)  Respiratory status: acceptable  Hydration status: acceptable

## 2025-01-28 NOTE — ANESTHESIA PREPROCEDURE EVALUATION
Anesthesia Evaluation     Patient summary reviewed and Nursing notes reviewed   no history of anesthetic complications:   NPO Solid Status: > 8 hours  NPO Liquid Status: > 2 hours           Airway   Mallampati: II  TM distance: >3 FB  Neck ROM: full  No difficulty expected  Dental      Comment: Perm upper front bridge    Pulmonary - negative pulmonary ROS and normal exam    breath sounds clear to auscultation  Cardiovascular - normal exam  Exercise tolerance: good (4-7 METS)    Rhythm: regular  Rate: normal    (+) hypertension, past MI , CAD, CHF , hyperlipidemia      Neuro/Psych- negative ROS  GI/Hepatic/Renal/Endo    (+) GERD    Musculoskeletal     Abdominal    Substance History - negative use     OB/GYN          Other - negative ROS       ROS/Med Hx Other: >4METS.HX HTN,CHF,HLD,GERD,MI/STENT 2016,NASAL FX/REPAIR.     ECHO 3/18/24 EF 66-70%,NO ISCHEMIA. PCP OV 11/12/24,    CARDS OV 10/17/24 F/U 6MTHS. KT               Anesthesia Plan    ASA 3     general     (Patient understands anesthesia not responsible for dental damage.)  intravenous induction     Anesthetic plan, risks, benefits, and alternatives have been provided, discussed and informed consent has been obtained with: patient.    Plan discussed with CRNA.    CODE STATUS:

## 2025-01-29 LAB
CYTO UR: NORMAL
LAB AP CASE REPORT: NORMAL
LAB AP CLINICAL INFORMATION: NORMAL
PATH REPORT.FINAL DX SPEC: NORMAL
PATH REPORT.GROSS SPEC: NORMAL

## 2025-02-05 ENCOUNTER — OFFICE VISIT (OUTPATIENT)
Dept: SURGERY | Facility: CLINIC | Age: 80
End: 2025-02-05
Payer: MEDICARE

## 2025-02-05 VITALS — HEIGHT: 64 IN | WEIGHT: 175 LBS | BODY MASS INDEX: 29.88 KG/M2 | RESPIRATION RATE: 16 BRPM

## 2025-02-05 DIAGNOSIS — K43.9 SPIGELIAN HERNIA: Primary | ICD-10-CM

## 2025-02-05 DIAGNOSIS — K43.2 VENTRAL INCISIONAL HERNIA: ICD-10-CM

## 2025-02-05 PROCEDURE — 99024 POSTOP FOLLOW-UP VISIT: CPT | Performed by: SURGERY

## 2025-02-05 RX ORDER — HYDROCODONE BITARTRATE AND ACETAMINOPHEN 5; 325 MG/1; MG/1
1 TABLET ORAL EVERY 6 HOURS PRN
Qty: 10 TABLET | Refills: 0 | Status: SHIPPED | OUTPATIENT
Start: 2025-02-05

## 2025-02-08 LAB
QT INTERVAL: 389 MS
QTC INTERVAL: 435 MS

## 2025-04-14 ENCOUNTER — OFFICE VISIT (OUTPATIENT)
Dept: CARDIOLOGY | Facility: CLINIC | Age: 80
End: 2025-04-14
Payer: MEDICARE

## 2025-04-14 VITALS
HEIGHT: 64 IN | DIASTOLIC BLOOD PRESSURE: 81 MMHG | BODY MASS INDEX: 30.39 KG/M2 | SYSTOLIC BLOOD PRESSURE: 133 MMHG | WEIGHT: 178 LBS | HEART RATE: 100 BPM

## 2025-04-14 DIAGNOSIS — M79.602 PAIN IN BOTH UPPER EXTREMITIES: ICD-10-CM

## 2025-04-14 DIAGNOSIS — I49.9 IRREGULAR HEART RATE: ICD-10-CM

## 2025-04-14 DIAGNOSIS — E78.2 MIXED HYPERLIPIDEMIA: ICD-10-CM

## 2025-04-14 DIAGNOSIS — M79.601 PAIN IN BOTH UPPER EXTREMITIES: ICD-10-CM

## 2025-04-14 DIAGNOSIS — I25.10 ATHEROSCLEROSIS OF CORONARY ARTERY OF NATIVE HEART WITHOUT ANGINA PECTORIS, UNSPECIFIED VESSEL OR LESION TYPE: Primary | Chronic | ICD-10-CM

## 2025-04-14 PROCEDURE — 99214 OFFICE O/P EST MOD 30 MIN: CPT | Performed by: INTERNAL MEDICINE

## 2025-04-14 PROCEDURE — 3075F SYST BP GE 130 - 139MM HG: CPT | Performed by: INTERNAL MEDICINE

## 2025-04-14 PROCEDURE — 93000 ELECTROCARDIOGRAM COMPLETE: CPT | Performed by: INTERNAL MEDICINE

## 2025-04-14 PROCEDURE — 3079F DIAST BP 80-89 MM HG: CPT | Performed by: INTERNAL MEDICINE

## 2025-04-14 NOTE — PROGRESS NOTES
Chief Complaint  Coronary Artery Disease and Hypertension (6m Follow up)    Subjective        Shreya Marino presents to [facility]  History of present illness:    Patient states overall she has been doing okay.  She did have a hernia surgery back in January.  She is having to take care of her  who had an apparent large heart attack.  She notes no bleeding problems.  She is apparently taking pravastatin and Zetia.  She still drinks iced tea and coffee but tries to drink some water.      Past Medical History:   Diagnosis Date    Atherosclerosis of coronary artery 07/06/2016    with previous stent/PCI (June 2016);     Biceps tendinitis of right upper extremity 04/25/2018    CAD (coronary artery disease) 07/06/2016    Chest pain     NO CURRENT    CHF (congestive heart failure)     NO CURRENT ISSUES    GERD (gastroesophageal reflux disease)     Heart attack     FOLLOWS W/MONNIN, NO CP    Heart burn     HLD (hyperlipidemia) 01/28/2021    Hypertension, essential 10/20/2021    Nasal fracture     PONV (postoperative nausea and vomiting)     Right rotator cuff tear 04/25/2018    Right shoulder pain     SLAP tear of shoulder 04/25/2018    Spigelian hernia     Subacromial impingement of right shoulder 04/18/2018    Vitamin D deficiency 01/28/2021         Past Surgical History:   Procedure Laterality Date    BREAST BIOPSY Left     NEGATIVE    CAROTID STENT      PT DENIES THIS HISTORY    CATARACT EXTRACTION      CORONARY ANGIOPLASTY WITH STENT PLACEMENT      HERNIA REPAIR      11/2024    HYSTERECTOMY      NASAL SEPTUM SURGERY  07/10/2018    CLOSED REDUCTION, FRACTURE    RENAL ARTERY STENT      VEIN SURGERY      VENTRAL HERNIA REPAIR N/A 1/28/2025    Procedure: VENTRAL / INCISIONAL HERNIA REPAIR LAPAROSCOPIC WITH RoposoINCI ROBOT;  Surgeon: Marisol Kaplan MD;  Location: [facility];  Service: Robotics - DaVinci;  Laterality: N/A;          Social History     Socioeconomic History     Marital status:    Tobacco Use    Smoking status: Never    Smokeless tobacco: Never    Tobacco comments:     Father smoked, i never smoked   Vaping Use    Vaping status: Never Used   Substance and Sexual Activity    Alcohol use: Never    Drug use: Never    Sexual activity: Not Currently     Partners: Male         Family History   Problem Relation Age of Onset    Heart attack Mother         Death 2022    Hearing loss Mother     Heart disease Mother     Hypertension Mother     Breast cancer Other     Heart disease Other     Cancer Other     Cancer Sister         Breast    Cancer Sister         Thyroid    Cancer Daughter         Breast    Hearing loss Sister     Malig Hyperthermia Neg Hx           No Known Allergies         Current Outpatient Medications:     aspirin 81 MG EC tablet, aspirin 81 mg oral tablet,delayed release (DR/EC) take 1 tablet (81 mg) by oral route once daily   Active, Disp: , Rfl:     BIOTIN PO, Take  by mouth., Disp: , Rfl:     Calcium Carbonate-Vit D-Min (CALTRATE 600+D PLUS PO), Take 1 tablet by mouth Daily., Disp: , Rfl:     cetirizine (zyrTEC) 10 MG tablet, Take 1 tablet by mouth Every Night., Disp: , Rfl:     ezetimibe (Zetia) 10 MG tablet, Take 1 tablet by mouth Daily., Disp: , Rfl:     multivitamin with minerals tablet tablet, Take 1 tablet by mouth Daily., Disp: , Rfl:     acetaminophen (Tylenol 8 Hour) 650 MG 8 hr tablet, Take 2 tablets by mouth Every 8 (Eight) Hours As Needed for Moderate Pain. (Patient not taking: Reported on 4/14/2025), Disp: 26 tablet, Rfl: 0    famotidine (PEPCID) 20 MG tablet, Take 1 tablet by mouth 2 (Two) Times a Day As Needed. (Patient not taking: Reported on 4/14/2025), Disp: , Rfl:     HYDROcodone-acetaminophen (NORCO) 5-325 MG per tablet, Take 1 tablet by mouth Every 6 (Six) Hours As Needed for Moderate Pain (Pain). (Patient not taking: Reported on 4/14/2025), Disp: 10 tablet, Rfl: 0    pitavastatin calcium (Livalo) 2 MG tablet tablet, Take 1 tablet by  "mouth Every Night. (Patient not taking: Reported on 4/14/2025), Disp: 90 tablet, Rfl: 3      ROS:  Cardiac review of systems negative.    Objective     /81   Pulse 100   Ht 162.6 cm (64\")   Wt 80.7 kg (178 lb)   BMI 30.55 kg/m²       General Appearance:   well developed  well nourished  HENT:   oropharynx moist  lips not cyanotic  Respiratory:  no respiratory distress  normal breath sounds  no rales  Cardiovascular:  no jugular venous distention  regular rhythm  S1 normal, S2 normal  no S3, no S4   no murmur  no rub, no thrill  No carotid bruit  pedal pulses normal  lower extremity edema: none    Musculoskeletal:  no clubbing of fingers.   normocephalic, head atraumatic  Skin:   warm, dry  Psychiatric:  judgement and insight appropriate  normal mood and affect    ECHO:  Results for orders placed during the hospital encounter of 03/18/24    Adult Transthoracic Echo Complete W/ Cont if Necessary Per Protocol    Interpretation Summary    Technically difficult study.    Left ventricular ejection fraction appears to be 66 - 70%.  Mildly hyperdynamic.    Left ventricular diastolic function is consistent with (grade I) impaired relaxation and age.    No significant valvular disease.    STRESS:    CATH:  No results found for this or any previous visit.    BMP:     Glucose   Date Value Ref Range Status   12/02/2024 100 (H) 65 - 99 mg/dL Final     BUN   Date Value Ref Range Status   12/02/2024 18 8 - 23 mg/dL Final     Creatinine   Date Value Ref Range Status   12/02/2024 0.87 0.57 - 1.00 mg/dL Final     Sodium   Date Value Ref Range Status   12/02/2024 141 136 - 145 mmol/L Final     Potassium   Date Value Ref Range Status   12/02/2024 4.3 3.5 - 5.2 mmol/L Final     Chloride   Date Value Ref Range Status   12/02/2024 103 98 - 107 mmol/L Final     CO2   Date Value Ref Range Status   12/02/2024 24.4 22.0 - 29.0 mmol/L Final     Calcium   Date Value Ref Range Status   12/02/2024 10.3 8.6 - 10.5 mg/dL Final "     BUN/Creatinine Ratio   Date Value Ref Range Status   12/02/2024 20.7 7.0 - 25.0 Final     Anion Gap   Date Value Ref Range Status   12/02/2024 13.6 5.0 - 15.0 mmol/L Final     eGFR   Date Value Ref Range Status   12/02/2024 67.0 >60.0 mL/min/1.73 Final     LIPIDS:  Total Cholesterol   Date Value Ref Range Status   12/02/2024 161 0 - 200 mg/dL Final     Triglycerides   Date Value Ref Range Status   12/02/2024 159 (H) 0 - 150 mg/dL Final     HDL Cholesterol   Date Value Ref Range Status   12/02/2024 43 40 - 60 mg/dL Final     LDL Cholesterol    Date Value Ref Range Status   12/02/2024 90 0 - 100 mg/dL Final     VLDL Cholesterol   Date Value Ref Range Status   12/02/2024 28 5 - 40 mg/dL Final     LDL/HDL Ratio   Date Value Ref Range Status   12/02/2024 2.00  Final           ECG 12 Lead    Date/Time: 4/14/2025 3:49 PM  Performed by: Beni Cueva MD    Authorized by: Beni Cueva MD  Comparison: compared with previous ECG from 1/28/2025  Comparison to previous ECG: Multiple PACs noted  Rhythm: sinus rhythm  Ectopy: atrial premature contractions  Comments: Feel just low lead placement and no previous myocardial infarctions.                   ASSESSMENT:  Diagnoses and all orders for this visit:    1. Atherosclerosis of coronary artery of native heart without angina pectoris, unspecified vessel or lesion type (Primary)    2. Mixed hyperlipidemia    3. Pain in both upper extremities    4. Irregular heart rate         PLAN:    1.  Patient states she is on both pravastatin and Zetia.  She was taken off the Livalo.  She had cholesterol checked 12/2/2024 with LDL 90, HDL 43, and triglycerides 159.  She has not tolerated other statins.  We will just continue to monitor.  2.  Continue the aspirin.  Patient had PCI to the mid LAD 6/14/2016.  3.  Encouraged patient to drink plenty of water.  Her previous echocardiograms showed hyperdynamic LV function with no significant valvular disease.  The last one was  3/18/2024.  4.  Blood pressures under good control.    5.  I got an EKG today due to an irregular sounding heart rhythm.  This did show normal sinus rhythm with a ventricular rate of 91 bpm and multiple PACs.  It does look like slightly low lead placement.      Return in about 6 months (around 10/14/2025).     Patient was given instructions and counseling regarding her condition or for health maintenance advice. Please see specific information pulled into the AVS if appropriate.         Beni Cueva MD   4/14/2025  15:49 EDT

## 2025-04-21 RX ORDER — PITAVASTATIN CALCIUM 2.09 MG/1
2 TABLET, FILM COATED ORAL NIGHTLY
Qty: 90 TABLET | Refills: 3 | OUTPATIENT
Start: 2025-04-21

## 2025-04-21 RX ORDER — PITAVASTATIN CALCIUM 2.09 MG/1
2 TABLET, FILM COATED ORAL NIGHTLY
Qty: 90 TABLET | Refills: 3 | Status: SHIPPED | OUTPATIENT
Start: 2025-04-21

## 2025-04-21 NOTE — TELEPHONE ENCOUNTER
PT LVM, NEEDING A REFILL ON PITAVASTATIN.  I CALLED TO CONFIRM.    OFFICE NOTE OF 04/14 STATES PT IS NOT TAKING IT.  BUT SHE REPORTS SHE IS TAKING IT ALONG WITH ZETIA.    PLEASE ADVISE, IF REFILL IS APPROPRIATE.  SHE IS COMPLETELY OUT OF MEDS.

## 2025-04-23 ENCOUNTER — TELEPHONE (OUTPATIENT)
Dept: CARDIOLOGY | Facility: CLINIC | Age: 80
End: 2025-04-23
Payer: MEDICARE

## 2025-04-23 NOTE — TELEPHONE ENCOUNTER
JENNY NICE.    PT CALLED, STATING THE PHARMACY WAS HESITANT IN GIVING HER THE REFILL OF LIVALO.  THEY TOLD HER IT HAD BEEN DISCONTINUED EVEN THOUGH A NEW PRESCRIPTION WAS SENT IN ON 04/21, PER [employer]'S APPROVAL TO REFILL IT.      OFFICE NOTE FROM DR. KAN FROM 04/14/2025 SAYS THAT PT HAD STOPPED THE LIVALO BUT SHE STATED SHE NEVER REPORTED THIS AND SHE DID NOT EVER STOP TAKING IT.  SHE ALSO TAKES ZETIA.     PT WANTS TO MAKE SURE TAKING BOTH OF THESE MEDS ARE APPROPRIATE.  I ASSURED HER THAT IT WAS OKAY FOR HER TO TAKE BOTH.    SHE IS CONCERNED THE PHARMACY WILL BE HESITANT AGAIN WHEN SHE NEEDS A REFILL.  ADVISED HER TO CALL OUR OFFICE IF THIS HAPPENS.

## 2025-05-12 ENCOUNTER — OFFICE VISIT (OUTPATIENT)
Dept: FAMILY MEDICINE CLINIC | Facility: CLINIC | Age: 80
End: 2025-05-12
Payer: MEDICARE

## 2025-05-12 VITALS
DIASTOLIC BLOOD PRESSURE: 63 MMHG | SYSTOLIC BLOOD PRESSURE: 137 MMHG | OXYGEN SATURATION: 96 % | HEART RATE: 92 BPM | HEIGHT: 64 IN | WEIGHT: 178 LBS | BODY MASS INDEX: 30.39 KG/M2

## 2025-05-12 DIAGNOSIS — Z13.29 SCREENING FOR THYROID DISORDER: ICD-10-CM

## 2025-05-12 DIAGNOSIS — E78.5 HYPERLIPIDEMIA, UNSPECIFIED HYPERLIPIDEMIA TYPE: Primary | ICD-10-CM

## 2025-05-12 DIAGNOSIS — Z13.820 SCREENING FOR OSTEOPOROSIS: ICD-10-CM

## 2025-05-12 DIAGNOSIS — R53.83 OTHER FATIGUE: ICD-10-CM

## 2025-05-12 DIAGNOSIS — R42 DIZZINESS: ICD-10-CM

## 2025-05-12 DIAGNOSIS — Z78.0 MENOPAUSE: ICD-10-CM

## 2025-05-12 DIAGNOSIS — Z12.31 ENCOUNTER FOR SCREENING MAMMOGRAM FOR MALIGNANT NEOPLASM OF BREAST: ICD-10-CM

## 2025-05-12 PROCEDURE — G2211 COMPLEX E/M VISIT ADD ON: HCPCS | Performed by: NURSE PRACTITIONER

## 2025-05-12 PROCEDURE — 1160F RVW MEDS BY RX/DR IN RCRD: CPT | Performed by: NURSE PRACTITIONER

## 2025-05-12 PROCEDURE — 3075F SYST BP GE 130 - 139MM HG: CPT | Performed by: NURSE PRACTITIONER

## 2025-05-12 PROCEDURE — 1159F MED LIST DOCD IN RCRD: CPT | Performed by: NURSE PRACTITIONER

## 2025-05-12 PROCEDURE — 1125F AMNT PAIN NOTED PAIN PRSNT: CPT | Performed by: NURSE PRACTITIONER

## 2025-05-12 PROCEDURE — 3078F DIAST BP <80 MM HG: CPT | Performed by: NURSE PRACTITIONER

## 2025-05-12 PROCEDURE — 99214 OFFICE O/P EST MOD 30 MIN: CPT | Performed by: NURSE PRACTITIONER

## 2025-05-12 NOTE — PROGRESS NOTES
Chief Complaint  Hyperlipidemia and Fatigue    Subjective            Shreya Marino presents to [facility]  History of Present Illness  Pt is a f/u for HLD. Pt has c/o chronic fatigue. She also c/o of occasional dizziness only first thing in the morning when she sits up quickly out of bed.  She denies any chest pain. She denies any dizziness today.    Pt is due labs/orders placed.    Pt is due mammogram/orders placed.    Pt is due dexa/orders placed.     Pt is followed by Dr. Cueva with cardiology.     FRANCY scheduled 6/2/25        Past Medical History:   Diagnosis Date    Atherosclerosis of coronary artery 07/06/2016    with previous stent/PCI (June 2016);     Biceps tendinitis of right upper extremity 04/25/2018    CAD (coronary artery disease) 07/06/2016    Chest pain     NO CURRENT    CHF (congestive heart failure)     NO CURRENT ISSUES    GERD (gastroesophageal reflux disease)     Heart attack     FOLLOWS W/LORETA, NO CP    Heart burn     HLD (hyperlipidemia) 01/28/2021    Hypertension, essential 10/20/2021    Nasal fracture     PONV (postoperative nausea and vomiting)     Right rotator cuff tear 04/25/2018    Right shoulder pain     SLAP tear of shoulder 04/25/2018    Spigelian hernia     Subacromial impingement of right shoulder 04/18/2018    Vitamin D deficiency 01/28/2021       No Known Allergies     Past Surgical History:   Procedure Laterality Date    BREAST BIOPSY Left     NEGATIVE    CAROTID STENT      PT DENIES THIS HISTORY    CATARACT EXTRACTION      CORONARY ANGIOPLASTY WITH STENT PLACEMENT      HERNIA REPAIR      11/2024    HYSTERECTOMY      NASAL SEPTUM SURGERY  07/10/2018    CLOSED REDUCTION, FRACTURE    RENAL ARTERY STENT      VEIN SURGERY      VENTRAL HERNIA REPAIR N/A 1/28/2025    Procedure: VENTRAL / INCISIONAL HERNIA REPAIR LAPAROSCOPIC WITH [email or web address] ROBOT;  Surgeon: Marisol Kaplan MD;  Location: [facility] OR;  Service: Robotics - [employer] App;   "Laterality: N/A;        Social History     Tobacco Use    Smoking status: Never    Smokeless tobacco: Never    Tobacco comments:     Father smoked, i never smoked   Substance Use Topics    Alcohol use: Never       Family History   Problem Relation Age of Onset    Heart attack Mother         Death 2022    Hearing loss Mother     Heart disease Mother     Hypertension Mother     Breast cancer Other     Heart disease Other     Cancer Other     Cancer Sister         Breast    Cancer Sister         Thyroid    Cancer Daughter         Breast    Hearing loss Sister     Malig Hyperthermia Neg Hx         Current Outpatient Medications on File Prior to Visit   Medication Sig    aspirin 81 MG EC tablet aspirin 81 mg oral tablet,delayed release (DR/EC) take 1 tablet (81 mg) by oral route once daily   Active    BIOTIN PO Take  by mouth.    Calcium Carbonate-Vit D-Min (CALTRATE 600+D PLUS PO) Take 1 tablet by mouth Daily.    cetirizine (zyrTEC) 10 MG tablet Take 1 tablet by mouth Every Night.    multivitamin with minerals tablet tablet Take 1 tablet by mouth Daily.    pitavastatin calcium (Livalo) 2 MG tablet tablet Take 1 tablet by mouth Every Night.    [DISCONTINUED] ezetimibe (Zetia) 10 MG tablet Take 1 tablet by mouth Daily. (Patient not taking: Reported on 5/12/2025)     No current facility-administered medications on file prior to visit.       Health Maintenance Due   Topic Date Due    ANNUAL WELLNESS VISIT  05/28/2025    DXA SCAN  06/23/2025       Objective     /63   Pulse 92   Ht 162.6 cm (64\")   Wt 80.7 kg (178 lb)   SpO2 96%   BMI 30.55 kg/m²       Physical Exam  Constitutional:       General: She is not in acute distress.     Appearance: Normal appearance. She is not ill-appearing.   HENT:      Head: Normocephalic and atraumatic.      Right Ear: Tympanic membrane, ear canal and external ear normal.      Left Ear: Tympanic membrane, ear canal and external ear normal.      Nose: Nose normal.   Cardiovascular:    "   Rate and Rhythm: Normal rate and regular rhythm.      Heart sounds: Normal heart sounds. No murmur heard.  Pulmonary:      Effort: Pulmonary effort is normal. No respiratory distress.      Breath sounds: Normal breath sounds.   Chest:      Chest wall: No tenderness.   Abdominal:      General: Abdomen is flat. Bowel sounds are normal. There is no distension.      Palpations: Abdomen is soft. There is no mass.      Tenderness: There is no abdominal tenderness. There is no guarding.   Musculoskeletal:         General: No swelling or tenderness. Normal range of motion.      Cervical back: Normal range of motion and neck supple.   Skin:     General: Skin is warm and dry.      Findings: No rash.   Neurological:      General: No focal deficit present.      Mental Status: She is alert and oriented to person, place, and time. Mental status is at baseline.      Gait: Gait normal.   Psychiatric:         Mood and Affect: Mood normal.         Behavior: Behavior normal.         Thought Content: Thought content normal.         Judgment: Judgment normal.           Result Review :                           Assessment and Plan        Diagnoses and all orders for this visit:    1. Hyperlipidemia, unspecified hyperlipidemia type (Primary)  Comments:  stable on Livalo 2mg, continue  Orders:  -     CBC w AUTO Differential; Future  -     Comprehensive metabolic panel; Future  -     Lipid panel; Future    2. Screening for thyroid disorder  -     TSH; Future    3. Encounter for screening mammogram for malignant neoplasm of breast  -     Mammo Screening Digital Tomosynthesis Bilateral With CAD; Future    4. Menopause  -     DEXA Bone Density Axial    5. Screening for osteoporosis  -     DEXA Bone Density Axial    6. Other fatigue  -     CBC w AUTO Differential; Future  -     Comprehensive metabolic panel; Future  -     TSH; Future    7. Dizziness  Comments:  adised to sit on side of bed before getting up in am, if dizziness persisits have  eval by cardiologist              Follow Up     Return in about 6 months (around 11/12/2025), or if symptoms worsen or fail to improve.    Patient was given instructions and counseling regarding her condition or for health maintenance advice. Please see specific information pulled into the AVS if appropriate.     Shreya Marino  reports that she has never smoked. She has never used smokeless tobacco.

## 2025-05-13 ENCOUNTER — LAB (OUTPATIENT)
Dept: LAB | Facility: HOSPITAL | Age: 80
End: 2025-05-13
Payer: MEDICARE

## 2025-05-13 DIAGNOSIS — R53.83 OTHER FATIGUE: ICD-10-CM

## 2025-05-13 DIAGNOSIS — E78.5 HYPERLIPIDEMIA, UNSPECIFIED HYPERLIPIDEMIA TYPE: ICD-10-CM

## 2025-05-13 DIAGNOSIS — Z13.29 SCREENING FOR THYROID DISORDER: ICD-10-CM

## 2025-05-13 LAB
ALBUMIN SERPL-MCNC: 4.1 G/DL (ref 3.5–5.2)
ALBUMIN/GLOB SERPL: 1.6 G/DL
ALP SERPL-CCNC: 83 U/L (ref 39–117)
ALT SERPL W P-5'-P-CCNC: 29 U/L (ref 1–33)
ANION GAP SERPL CALCULATED.3IONS-SCNC: 6.8 MMOL/L (ref 5–15)
AST SERPL-CCNC: 29 U/L (ref 1–32)
BASOPHILS # BLD AUTO: 0.04 10*3/MM3 (ref 0–0.2)
BASOPHILS NFR BLD AUTO: 0.5 % (ref 0–1.5)
BILIRUB SERPL-MCNC: 0.5 MG/DL (ref 0–1.2)
BUN SERPL-MCNC: 14 MG/DL (ref 8–23)
BUN/CREAT SERPL: 18.2 (ref 7–25)
CALCIUM SPEC-SCNC: 10.3 MG/DL (ref 8.6–10.5)
CHLORIDE SERPL-SCNC: 106 MMOL/L (ref 98–107)
CHOLEST SERPL-MCNC: 164 MG/DL (ref 0–200)
CO2 SERPL-SCNC: 25.2 MMOL/L (ref 22–29)
CREAT SERPL-MCNC: 0.77 MG/DL (ref 0.57–1)
DEPRECATED RDW RBC AUTO: 42.4 FL (ref 37–54)
EGFRCR SERPLBLD CKD-EPI 2021: 77.6 ML/MIN/1.73
EOSINOPHIL # BLD AUTO: 0.11 10*3/MM3 (ref 0–0.4)
EOSINOPHIL NFR BLD AUTO: 1.5 % (ref 0.3–6.2)
ERYTHROCYTE [DISTWIDTH] IN BLOOD BY AUTOMATED COUNT: 12.3 % (ref 12.3–15.4)
GLOBULIN UR ELPH-MCNC: 2.5 GM/DL
GLUCOSE SERPL-MCNC: 105 MG/DL (ref 65–99)
HCT VFR BLD AUTO: 40.8 % (ref 34–46.6)
HDLC SERPL-MCNC: 45 MG/DL (ref 40–60)
HGB BLD-MCNC: 13.8 G/DL (ref 12–15.9)
IMM GRANULOCYTES # BLD AUTO: 0.03 10*3/MM3 (ref 0–0.05)
IMM GRANULOCYTES NFR BLD AUTO: 0.4 % (ref 0–0.5)
LDLC SERPL CALC-MCNC: 92 MG/DL (ref 0–100)
LDLC/HDLC SERPL: 1.95 {RATIO}
LYMPHOCYTES # BLD AUTO: 3.62 10*3/MM3 (ref 0.7–3.1)
LYMPHOCYTES NFR BLD AUTO: 48.4 % (ref 19.6–45.3)
MCH RBC QN AUTO: 31.8 PG (ref 26.6–33)
MCHC RBC AUTO-ENTMCNC: 33.8 G/DL (ref 31.5–35.7)
MCV RBC AUTO: 94 FL (ref 79–97)
MONOCYTES # BLD AUTO: 0.56 10*3/MM3 (ref 0.1–0.9)
MONOCYTES NFR BLD AUTO: 7.5 % (ref 5–12)
NEUTROPHILS NFR BLD AUTO: 3.12 10*3/MM3 (ref 1.7–7)
NEUTROPHILS NFR BLD AUTO: 41.7 % (ref 42.7–76)
NRBC BLD AUTO-RTO: 0 /100 WBC (ref 0–0.2)
PLATELET # BLD AUTO: 289 10*3/MM3 (ref 140–450)
PMV BLD AUTO: 10.7 FL (ref 6–12)
POTASSIUM SERPL-SCNC: 4.7 MMOL/L (ref 3.5–5.2)
PROT SERPL-MCNC: 6.6 G/DL (ref 6–8.5)
RBC # BLD AUTO: 4.34 10*6/MM3 (ref 3.77–5.28)
SODIUM SERPL-SCNC: 138 MMOL/L (ref 136–145)
TRIGL SERPL-MCNC: 156 MG/DL (ref 0–150)
TSH SERPL DL<=0.05 MIU/L-ACNC: 3.62 UIU/ML (ref 0.27–4.2)
VLDLC SERPL-MCNC: 27 MG/DL (ref 5–40)
WBC NRBC COR # BLD AUTO: 7.48 10*3/MM3 (ref 3.4–10.8)

## 2025-05-13 PROCEDURE — 84443 ASSAY THYROID STIM HORMONE: CPT

## 2025-05-13 PROCEDURE — 85025 COMPLETE CBC W/AUTO DIFF WBC: CPT

## 2025-05-13 PROCEDURE — 80061 LIPID PANEL: CPT

## 2025-05-13 PROCEDURE — 80053 COMPREHEN METABOLIC PANEL: CPT

## 2025-05-13 PROCEDURE — 36415 COLL VENOUS BLD VENIPUNCTURE: CPT

## 2025-05-14 ENCOUNTER — TELEPHONE (OUTPATIENT)
Dept: CARDIOLOGY | Facility: CLINIC | Age: 80
End: 2025-05-14
Payer: MEDICARE

## 2025-05-14 NOTE — TELEPHONE ENCOUNTER
Patient called stating she is having terrible dizzy spells. Today the patient isn't feeling well at all. Patient will take her BP/HR and call the office back.     Patient called back with BP/HR reading of 123/71-80. Patient states she just feels bad, dizzy all of the time. Patient reports the dizziness has been an issue since her hernia surgery off and on, but the other day it was really bad.     Patient advised to keep BP/HR log and call on Monday with readings.     Patient voice sounded slurred to RN, told patient that severe dizziness for no apparent reason with slurred speech would need to be evaluated at the hospital. Patient states she doesn't feel her speech is slurred.    Patient states she is very stressed and tired, will call back on Monday with BP/HR log

## 2025-06-02 ENCOUNTER — OFFICE VISIT (OUTPATIENT)
Dept: FAMILY MEDICINE CLINIC | Facility: CLINIC | Age: 80
End: 2025-06-02
Payer: MEDICARE

## 2025-06-02 VITALS
HEART RATE: 78 BPM | BODY MASS INDEX: 30.73 KG/M2 | SYSTOLIC BLOOD PRESSURE: 135 MMHG | DIASTOLIC BLOOD PRESSURE: 75 MMHG | WEIGHT: 180 LBS | HEIGHT: 64 IN | OXYGEN SATURATION: 96 %

## 2025-06-02 DIAGNOSIS — Z00.00 MEDICARE ANNUAL WELLNESS VISIT, SUBSEQUENT: Primary | ICD-10-CM

## 2025-06-02 PROCEDURE — 3075F SYST BP GE 130 - 139MM HG: CPT | Performed by: NURSE PRACTITIONER

## 2025-06-02 PROCEDURE — 1170F FXNL STATUS ASSESSED: CPT | Performed by: NURSE PRACTITIONER

## 2025-06-02 PROCEDURE — 96160 PT-FOCUSED HLTH RISK ASSMT: CPT | Performed by: NURSE PRACTITIONER

## 2025-06-02 PROCEDURE — G0439 PPPS, SUBSEQ VISIT: HCPCS | Performed by: NURSE PRACTITIONER

## 2025-06-02 PROCEDURE — 1160F RVW MEDS BY RX/DR IN RCRD: CPT | Performed by: NURSE PRACTITIONER

## 2025-06-02 PROCEDURE — 1126F AMNT PAIN NOTED NONE PRSNT: CPT | Performed by: NURSE PRACTITIONER

## 2025-06-02 PROCEDURE — 3078F DIAST BP <80 MM HG: CPT | Performed by: NURSE PRACTITIONER

## 2025-06-02 PROCEDURE — 1159F MED LIST DOCD IN RCRD: CPT | Performed by: NURSE PRACTITIONER

## 2025-06-02 RX ORDER — EZETIMIBE 10 MG/1
10 TABLET ORAL DAILY
COMMUNITY

## 2025-06-02 NOTE — PROGRESS NOTES
Subjective   The ABCs of the Annual Wellness Visit  Medicare Wellness Visit      Shreya Marino is a 80 y.o. patient who presents for a Medicare Wellness Visit.    The following portions of the patient's history were reviewed and   updated as appropriate: allergies, current medications, past family history, past medical history, past social history, past surgical history, and problem list.    Compared to one year ago, the patient's physical   health is the same.  Compared to one year ago, the patient's mental   health is the same.    Recent Hospitalizations:  She was not admitted to the hospital during the last year.     Current Medical Providers:  Patient Care Team:  Manju Rodney APRN as PCP - General (Nurse Practitioner)  Beni Cueva MD as Consulting Physician (Cardiology)    Outpatient Medications Prior to Visit   Medication Sig Dispense Refill    aspirin 81 MG EC tablet aspirin 81 mg oral tablet,delayed release (DR/EC) take 1 tablet (81 mg) by oral route once daily   Active      BIOTIN PO Take  by mouth.      Calcium Carbonate-Vit D-Min (CALTRATE 600+D PLUS PO) Take 1 tablet by mouth Daily.      cetirizine (zyrTEC) 10 MG tablet Take 1 tablet by mouth Every Night.      ezetimibe (ZETIA) 10 MG tablet Take 1 tablet by mouth Daily.      multivitamin with minerals tablet tablet Take 1 tablet by mouth Daily.      pitavastatin calcium (Livalo) 2 MG tablet tablet Take 1 tablet by mouth Every Night. 90 tablet 3     No facility-administered medications prior to visit.     No opioid medication identified on active medication list. I have reviewed chart for other potential  high risk medication/s and harmful drug interactions in the elderly.      Aspirin is on active medication list. Aspirin use is indicated based on review of current medical condition/s. Pros and cons of this therapy have been discussed today. Benefits of this medication outweigh potential harm.  Patient has been encouraged to continue  "taking this medication.  .      Patient Active Problem List   Diagnosis    Atherosclerosis of coronary artery    Biceps tendinitis of right upper extremity    Heart attack    Heartburn    Hyperlipidemia    Nasal fracture    Right rotator cuff tear    Vitamin D deficiency    Hypertension, essential    Spigelian hernia    Ventral incisional hernia     Advance Care Planning Advance Directive is on file.  ACP discussion was held with the patient during this visit. Patient has an advance directive in EMR which is still valid.             Objective   Vitals:    25 1407   BP: 135/75   Pulse: 78   SpO2: 96%   Weight: 81.6 kg (180 lb)   Height: 162.6 cm (64\")   PainSc: 0-No pain       Estimated body mass index is 30.9 kg/m² as calculated from the following:    Height as of this encounter: 162.6 cm (64\").    Weight as of this encounter: 81.6 kg (180 lb).                Does the patient have evidence of cognitive impairment? No  Lab Results   Component Value Date    TRIG 156 (H) 2025    HDL 45 2025    LDL 92 2025    VLDL 27 2025                                                                                               Health  Risk Assessment    Smoking Status:  Social History     Tobacco Use   Smoking Status Never   Smokeless Tobacco Never   Tobacco Comments    Father smoked, i never smoked     Alcohol Consumption:  Social History     Substance and Sexual Activity   Alcohol Use Never       Fall Risk Screen  STEADI Fall Risk Assessment was completed, and patient is at LOW risk for falls.Assessment completed on:2025    Depression Screening   Little interest or pleasure in doing things? Not at all   Feeling down, depressed, or hopeless? Not at all   PHQ-2 Total Score 0      Health Habits and Functional and Cognitive Screenin/2/2025     2:00 PM   Functional & Cognitive Status   Do you have difficulty preparing food and eating? No   Do you have difficulty bathing yourself, getting " dressed or grooming yourself? No   Do you have difficulty using the toilet? No   Do you have difficulty moving around from place to place? No   Do you have trouble with steps or getting out of a bed or a chair? No   Current Diet Well Balanced Diet   Dental Exam Up to date   Eye Exam Up to date   Exercise (times per week) 5 times per week   Current Exercises Include House Cleaning;Yard Work   Do you need help using the phone?  No   Are you deaf or do you have serious difficulty hearing?  No   Do you need help to go to places out of walking distance? No   Do you need help shopping? No   Do you need help preparing meals?  No   Do you need help with housework?  No   Do you need help with laundry? No   Do you need help taking your medications? No   Do you need help managing money? No   Do you ever drive or ride in a car without wearing a seat belt? No   Have you felt unusual stress, anger or loneliness in the last month? No   Who do you live with? Spouse   If you need help, do you have trouble finding someone available to you? No   Have you been bothered in the last four weeks by sexual problems? No   Do you have difficulty concentrating, remembering or making decisions? No           Age-appropriate Screening Schedule:  Refer to the list below for future screening recommendations based on patient's age, sex and/or medical conditions. Orders for these recommended tests are listed in the plan section. The patient has been provided with a written plan.    Health Maintenance List  Health Maintenance   Topic Date Due    DXA SCAN  06/23/2025    COVID-19 Vaccine (8 - 2024-25 season) 05/12/2026 (Originally 5/5/2025)    ZOSTER VACCINE (1 of 2) 05/12/2026 (Originally 5/31/1993)    RSV Vaccine - Adults (1 - 1-dose 75+ series) 06/02/2026 (Originally 5/31/2018)    TDAP/TD VACCINES (1 - Tdap) 06/02/2026 (Originally 5/31/1964)    INFLUENZA VACCINE  07/01/2025    LIPID PANEL  05/13/2026    ANNUAL WELLNESS VISIT  06/02/2026     Pneumococcal Vaccine 50+  Completed                                                                                                                                                CMS Preventative Services Quick Reference  Risk Factors Identified During Encounter  None Identified    The above risks/problems have been discussed with the patient.  Pertinent information has been shared with the patient in the After Visit Summary.  An After Visit Summary and PPPS were made available to the patient.    Follow Up:   Next Medicare Wellness visit to be scheduled in 1 year.     Assessment & Plan  Medicare annual wellness visit, subsequent              Follow Up:   Return in about 1 year (around 6/2/2026) for Medicare Wellness.

## 2025-07-15 ENCOUNTER — HOSPITAL ENCOUNTER (OUTPATIENT)
Dept: BONE DENSITY | Facility: HOSPITAL | Age: 80
Discharge: HOME | End: 2025-07-15
Payer: MEDICARE

## 2025-07-15 ENCOUNTER — HOSPITAL ENCOUNTER (OUTPATIENT)
Dept: MAMMOGRAPHY | Facility: HOSPITAL | Age: 80
Discharge: HOME | End: 2025-07-15
Payer: MEDICARE

## 2025-07-15 DIAGNOSIS — Z12.31 ENCOUNTER FOR SCREENING MAMMOGRAM FOR MALIGNANT NEOPLASM OF BREAST: ICD-10-CM

## 2025-07-15 PROCEDURE — 77080 DXA BONE DENSITY AXIAL: CPT

## 2025-07-15 PROCEDURE — 77067 SCR MAMMO BI INCL CAD: CPT

## 2025-07-15 PROCEDURE — 77063 BREAST TOMOSYNTHESIS BI: CPT

## 2025-07-23 ENCOUNTER — EXTERNAL PBMM DATA (OUTPATIENT)
Dept: PHARMACY | Facility: OTHER | Age: 80
End: 2025-07-23
Payer: MEDICARE

## (undated) DEVICE — SHEET,DRAPE,70X85,STERILE: Brand: MEDLINE

## (undated) DEVICE — TIP COVER ACCESSORY

## (undated) DEVICE — SYS CLOSE PORTII CARTR/THOMASN XL

## (undated) DEVICE — ANTIBACTERIAL UNDYED BRAIDED (POLYGLACTIN 910), SYNTHETIC ABSORBABLE SUTURE: Brand: COATED VICRYL

## (undated) DEVICE — LAPAROVUE VISIBILITY SYSTEM LAPAROSCOPIC SOLUTIONS: Brand: LAPAROVUE

## (undated) DEVICE — SOL IRR NACL 0.9PCT BO 1000ML

## (undated) DEVICE — 1LYRTR 16FR10ML100%SIL UMS SNP: Brand: MEDLINE INDUSTRIES, INC.

## (undated) DEVICE — STERILE POLYISOPRENE POWDER-FREE SURGICAL GLOVES WITH EMOLLIENT COATING: Brand: PROTEXIS

## (undated) DEVICE — ENDOPATH XCEL WITH OPTIVIEW TECHNOLOGY BLADELESS TROCARS WITH STABILITY SLEEVES: Brand: ENDOPATH XCEL OPTIVIEW

## (undated) DEVICE — SEAL

## (undated) DEVICE — THE STERILE LIGHT HANDLE COVER IS USED WITH STERIS SURGICAL LIGHTING AND VISUALIZATION SYSTEMS.

## (undated) DEVICE — SYR LUERLOK 30CC

## (undated) DEVICE — STRIP CLS WND SUTURESTRIP/PLS 0.5X4IN TP1103

## (undated) DEVICE — ENDOPATH PNEUMONEEDLE INSUFFLATION NEEDLES WITH LUER LOCK CONNECTORS 120MM: Brand: ENDOPATH

## (undated) DEVICE — INTENDED FOR TISSUE SEPARATION, AND OTHER PROCEDURES THAT REQUIRE A SHARP SURGICAL BLADE TO PUNCTURE OR CUT.: Brand: BARD-PARKER ® CARBON RIB-BACK BLADES

## (undated) DEVICE — SUT MERSILENE POLYSTR UR6 BR 0 75CM GRN

## (undated) DEVICE — NON-WOVEN ADHESIVE WOUND DRESSING: Brand: PRIMAPORE ADHESIVE WOUND DRSG 7.2*5CM

## (undated) DEVICE — GOWN,REINFORCE,POLY,SIRUS,BREATH SLV,XLG: Brand: MEDLINE

## (undated) DEVICE — LAPAROSCOPIC SCISSORS: Brand: EPIX LAPAROSCOPIC SCISSORS

## (undated) DEVICE — SYR LL TP 10ML STRL

## (undated) DEVICE — SLV SCD KN/LEN ADJ EXPRSS BLENDED MD 1P/U

## (undated) DEVICE — DAVINCI-LF: Brand: MEDLINE INDUSTRIES, INC.

## (undated) DEVICE — GLV SURG SENSICARE PI ORTHO SZ8 LF STRL

## (undated) DEVICE — TOTAL TRAY, 16FR 10ML SIL FOLEY, URN: Brand: MEDLINE

## (undated) DEVICE — ARM DRAPE

## (undated) DEVICE — PENCL ES MEGADINE EZ/CLEAN BUTN W/HOLSTR 10FT

## (undated) DEVICE — BLADELESS OBTURATOR: Brand: WECK VISTA